# Patient Record
Sex: FEMALE | Race: WHITE | Employment: FULL TIME | URBAN - NONMETROPOLITAN AREA
[De-identification: names, ages, dates, MRNs, and addresses within clinical notes are randomized per-mention and may not be internally consistent; named-entity substitution may affect disease eponyms.]

---

## 2019-03-25 ENCOUNTER — HOSPITAL ENCOUNTER (EMERGENCY)
Age: 24
Discharge: HOME | End: 2019-03-25
Payer: COMMERCIAL

## 2019-03-25 VITALS
DIASTOLIC BLOOD PRESSURE: 70 MMHG | OXYGEN SATURATION: 100 % | BODY MASS INDEX: 24.59 KG/M2 | WEIGHT: 144 LBS | HEIGHT: 64 IN | HEART RATE: 80 BPM | SYSTOLIC BLOOD PRESSURE: 128 MMHG | RESPIRATION RATE: 16 BRPM | TEMPERATURE: 98.2 F

## 2019-03-25 DIAGNOSIS — L20.9 ATOPIC DERMATITIS, UNSPECIFIED TYPE: Primary | ICD-10-CM

## 2019-03-25 PROCEDURE — 99213 OFFICE O/P EST LOW 20 MIN: CPT | Performed by: NURSE PRACTITIONER

## 2019-03-25 PROCEDURE — 99213 OFFICE O/P EST LOW 20 MIN: CPT

## 2019-03-25 RX ORDER — M-VIT,TX,IRON,MINS/CALC/FOLIC 27MG-0.4MG
1 TABLET ORAL DAILY
COMMUNITY

## 2019-03-25 RX ORDER — MELATONIN 3 MG
TABLET ORAL
Status: ON HOLD | COMMUNITY
End: 2021-02-03

## 2019-03-25 RX ORDER — METHYLPREDNISOLONE 4 MG/1
TABLET ORAL
Qty: 1 KIT | Refills: 0 | Status: SHIPPED | OUTPATIENT
Start: 2019-03-25 | End: 2019-03-25 | Stop reason: SDUPTHER

## 2019-03-25 RX ORDER — AMPICILLIN TRIHYDRATE 250 MG
CAPSULE ORAL
Status: ON HOLD | COMMUNITY
End: 2021-02-03

## 2019-03-25 RX ORDER — METHYLPREDNISOLONE 4 MG/1
TABLET ORAL
Qty: 1 KIT | Refills: 0 | Status: SHIPPED | OUTPATIENT
Start: 2019-03-25 | End: 2019-03-31

## 2019-03-25 ASSESSMENT — ENCOUNTER SYMPTOMS
VOICE CHANGE: 0
NAUSEA: 0
COLOR CHANGE: 1
DIARRHEA: 0
WHEEZING: 0
ABDOMINAL DISTENTION: 0
CHEST TIGHTNESS: 0
ANAL BLEEDING: 0
SINUS PRESSURE: 0
SORE THROAT: 0
STRIDOR: 0
CHOKING: 0
SHORTNESS OF BREATH: 0
FACIAL SWELLING: 0
COUGH: 0
RHINORRHEA: 0
PERI-ORBITAL EDEMA: 0
CONSTIPATION: 0
APNEA: 0
RECTAL PAIN: 0
TROUBLE SWALLOWING: 0
THROAT SWELLING: 0
HOARSE VOICE: 0
SINUS PAIN: 0
BLOOD IN STOOL: 0
ABDOMINAL PAIN: 0
VOMITING: 0

## 2019-05-26 ENCOUNTER — HOSPITAL ENCOUNTER (EMERGENCY)
Age: 24
Discharge: HOME | End: 2019-05-26
Payer: COMMERCIAL

## 2019-05-26 VITALS
OXYGEN SATURATION: 100 % | DIASTOLIC BLOOD PRESSURE: 77 MMHG | SYSTOLIC BLOOD PRESSURE: 114 MMHG | HEART RATE: 52 BPM | WEIGHT: 138 LBS | TEMPERATURE: 98.2 F | HEIGHT: 64 IN | BODY MASS INDEX: 23.56 KG/M2 | RESPIRATION RATE: 16 BRPM

## 2019-05-26 DIAGNOSIS — L23.9 ALLERGIC CONTACT DERMATITIS, UNSPECIFIED TRIGGER: Primary | ICD-10-CM

## 2019-05-26 PROCEDURE — 99213 OFFICE O/P EST LOW 20 MIN: CPT | Performed by: NURSE PRACTITIONER

## 2019-05-26 PROCEDURE — 99212 OFFICE O/P EST SF 10 MIN: CPT

## 2019-05-26 RX ORDER — PREDNISONE 20 MG/1
20 TABLET ORAL 2 TIMES DAILY
Qty: 10 TABLET | Refills: 0 | Status: SHIPPED | OUTPATIENT
Start: 2019-05-26 | End: 2019-05-31

## 2019-05-26 ASSESSMENT — ENCOUNTER SYMPTOMS
DIARRHEA: 0
TROUBLE SWALLOWING: 0
VOMITING: 0
SHORTNESS OF BREATH: 0
SORE THROAT: 0
NAUSEA: 0

## 2019-05-26 NOTE — ED PROVIDER NOTES
[place]  Urgent Care Encounter       CHIEF COMPLAINT       Chief Complaint   Patient presents with    Facial Swelling       Nurses Notes reviewed and I agree except as noted in the HPI. HISTORY OF PRESENT ILLNESS   Aylin Buck is a 24 y.o. female who presents to the urgent care for complaints of facial swelling and rash. The patient states she woke with this rash this morning. She is been using Benadryl to control. She denies any new beauty products, lotions, soaps. She denies any known contact with any plants or irritants. She denies any shortness of breath or difficulty swallowing. She states the rash is present on her face. HPI    REVIEW OF SYSTEMS     Review of Systems   Constitutional: Negative for activity change, appetite change, chills and fever. HENT: Negative for sore throat and trouble swallowing. Respiratory: Negative for shortness of breath. Cardiovascular: Negative for chest pain. Gastrointestinal: Negative for diarrhea, nausea and vomiting. Skin: Positive for rash. Allergic/Immunologic: Negative for environmental allergies and food allergies. PAST MEDICAL HISTORY   History reviewed. No pertinent past medical history. SURGICALHISTORY     Patient  has no past surgical history on file. CURRENT MEDICATIONS       Previous Medications    CINNAMON 500 MG CAPS    Take by mouth    DHEA 10 MG TABS    Take by mouth    MULTIPLE VITAMINS-MINERALS (THERAPEUTIC MULTIVITAMIN-MINERALS) TABLET    Take 1 tablet by mouth daily    NONFORMULARY    Indications: Liptic acid        ALLERGIES     Patient is is allergic to percocet [oxycodone-acetaminophen] and vicodin [hydrocodone-acetaminophen]. Patients   There is no immunization history on file for this patient. FAMILY HISTORY     Patient's family history is not on file. SOCIAL HISTORY     Patient  reports that she has never smoked.  She has never used smokeless tobacco. She reports that she does not drink alcohol or use drugs. PHYSICAL EXAM     ED TRIAGE VITALS  BP: 114/77, Temp: 98.2 °F (36.8 °C), Pulse: 52, Resp: 16, SpO2: 100 %,Estimated body mass index is 23.69 kg/m² as calculated from the following:    Height as of this encounter: 5' 4\" (1.626 m). Weight as of this encounter: 138 lb (62.6 kg). ,Patient's last menstrual period was 05/06/2019. Physical Exam   Constitutional: She is oriented to person, place, and time. Vital signs are normal. She appears well-developed and well-nourished. She is active and cooperative. Non-toxic appearance. She does not have a sickly appearance. She does not appear ill. No distress. HENT:   Head: Normocephalic and atraumatic. Cardiovascular: Normal rate. Pulmonary/Chest: Effort normal.   Neurological: She is alert and oriented to person, place, and time. Skin: Skin is warm and dry. Rash noted. Rash is maculopapular (Macular papular rash noted on the patient's face. Consistent with contact dermatitis. ). Nursing note and vitals reviewed. DIAGNOSTIC RESULTS     Labs:No results found for this visit on 05/26/19. IMAGING:    No orders to display         EKG:      URGENT CARE COURSE:     Vitals:    05/26/19 1504   BP: 114/77   Pulse: 52   Resp: 16   Temp: 98.2 °F (36.8 °C)   TempSrc: Oral   SpO2: 100%   Weight: 138 lb (62.6 kg)   Height: 5' 4\" (1.626 m)       Medications - No data to display         PROCEDURES:  None    FINAL IMPRESSION      1. Allergic contact dermatitis, unspecified trigger          DISPOSITION/ PLAN     The patient's physical exam is consistent allergic contact dermatitis. She will be treated with prednisone. The patient is to take the medication as prescribed/directed. The patient is to follow-up with their PCP in 2-3 days. They are to go to the ER for any worsening symptoms. The patient/caregiver were agreeable to this plan of care and voiced no concerns at time of discharge.   The patient was ambulatory out of the

## 2019-05-26 NOTE — ED TRIAGE NOTES
Pt to [facility] ambulatory with facial swelling. This started around 0700 today. No new foods, medications, or body lotions.

## 2020-11-10 ENCOUNTER — HOSPITAL ENCOUNTER (EMERGENCY)
Age: 25
Discharge: HOME | End: 2020-11-10
Attending: EMERGENCY MEDICINE
Payer: COMMERCIAL

## 2020-11-10 ENCOUNTER — APPOINTMENT (OUTPATIENT)
Dept: INTERVENTIONAL RADIOLOGY/VASCULAR | Age: 25
End: 2020-11-10
Payer: COMMERCIAL

## 2020-11-10 VITALS
TEMPERATURE: 98.9 F | SYSTOLIC BLOOD PRESSURE: 110 MMHG | HEART RATE: 72 BPM | OXYGEN SATURATION: 99 % | RESPIRATION RATE: 18 BRPM | DIASTOLIC BLOOD PRESSURE: 68 MMHG

## 2020-11-10 PROCEDURE — 93971 EXTREMITY STUDY: CPT

## 2020-11-10 PROCEDURE — 99283 EMERGENCY DEPT VISIT LOW MDM: CPT

## 2020-11-10 ASSESSMENT — PAIN SCALES - GENERAL: PAINLEVEL_OUTOF10: 6

## 2020-11-10 ASSESSMENT — PAIN DESCRIPTION - PAIN TYPE: TYPE: ACUTE PAIN

## 2020-11-10 ASSESSMENT — PAIN DESCRIPTION - ORIENTATION: ORIENTATION: RIGHT

## 2020-11-10 ASSESSMENT — PAIN DESCRIPTION - LOCATION: LOCATION: LEG

## 2020-11-10 ASSESSMENT — PAIN DESCRIPTION - DESCRIPTORS: DESCRIPTORS: ACHING;BURNING

## 2020-11-10 NOTE — ED PROVIDER NOTES
[address] ENCOUNTER      PATIENT NAME: Tiffanie Ramey  MRN: [id number]  : 1995  RITCHIE: 11/10/2020  PROVIDER: Axel Ramos MD      CHIEF COMPLAINT       Chief Complaint   Patient presents with    Leg Swelling       Nurses Notes reviewed and I agreeexcept as noted in the HPI. HISTORY OF PRESENT ILLNESS    Tiffanie Ramey is a 31 y.o. female who presents to Emergency Department with right leg pain in the last 10 days. Onset: Gradual  Location: At home  Severity: Mild to moderate  Timing: In the last 1.5 weeks  Modifying factors: Worse on standing for long time  Quality: Pain and swelling sensation to right lower leg. Radiation: None  Duration: Intermittent  Context: Right leg pain and worsening superficial varicosity vein in the last 1.5 weeks. She is pregnant at 27 weeks. Prior episodes: Occasional  Therapy today: None  Associated Symptoms: No shortness of breath  Additional history: OB is Soha Sinha. This HPI was provided by the patient. REVIEW OF SYSTEMS     Review of Systems   Constitutional: Negative for activity change, appetite change, chills, fatigue, fever and unexpected weight change. HENT: Negative for congestion, ear discharge, ear pain, hearing loss, nosebleeds, rhinorrhea and sore throat. Eyes: Negative for photophobia, pain, discharge, redness and itching. Respiratory: Negative for cough, chest tightness, shortness of breath, wheezing and stridor. Cardiovascular: Negative for chest pain, palpitations and leg swelling. Gastrointestinal: Negative for abdominal distention, abdominal pain, constipation, diarrhea, nausea and vomiting. Endocrine: Negative for cold intolerance, heat intolerance, polydipsia and polyphagia. Genitourinary: Negative for dysuria, flank pain, frequency and hematuria. Musculoskeletal: Positive for myalgias. Negative for arthralgias, back pain, gait problem, neck pain and neck stiffness. Skin: Negative for pallor, rash and wound. Allergic/Immunologic: Negative for environmental allergies and food allergies. Neurological: Negative for dizziness, tremors, syncope, weakness and headaches. Psychiatric/Behavioral: Negative for agitation, behavioral problems, confusion, self-injury, sleep disturbance and suicidal ideas. PAST MEDICAL HISTORY   History reviewed. No pertinent past medical history. SURGICAL HISTORY     History reviewed. No pertinent surgical history. CURRENT MEDICATIONS       Discharge Medication List as of 11/10/2020  9:00 PM      CONTINUE these medications which have NOT CHANGED    Details   Multiple Vitamins-Minerals (THERAPEUTIC MULTIVITAMIN-MINERALS) tablet Take 1 tablet by mouth dailyHistorical Med      Cinnamon 500 MG CAPS Take by mouthHistorical Med      DHEA 10 MG TABS Take by mouthHistorical Med      NONFORMULARY Indications: Liptic acid Historical Med             ALLERGIES     Percocet [oxycodone-acetaminophen] and Vicodin [hydrocodone-acetaminophen]    FAMILY HISTORY     has no family status information on file. family history is not on file. SOCIAL HISTORY      reports that she has never smoked. She has never used smokeless tobacco. She reports that she does not drink alcohol or use drugs. PHYSICAL EXAM     INITIAL VITALS:  oral temperature is 98.9 °F (37.2 °C). Her blood pressure is 110/68 and her pulse is 72. Her respiration is 18 and oxygen saturation is 99%. Physical Exam  Vitals signs and nursing note reviewed. Constitutional:       Appearance: She is well-developed. She is not diaphoretic. HENT:      Head: Normocephalic and atraumatic. Nose: Nose normal.   Eyes:      General: No scleral icterus. Right eye: No discharge. Left eye: No discharge. Conjunctiva/sclera: Conjunctivae normal.      Pupils: Pupils are equal, round, and reactive to light. Neck:      Musculoskeletal: Normal range of motion and neck supple. Vascular: No JVD. Trachea: No tracheal deviation. Cardiovascular:      Rate and Rhythm: Normal rate and regular rhythm. Heart sounds: Normal heart sounds. No murmur. No friction rub. No gallop. Pulmonary:      Effort: Pulmonary effort is normal. No respiratory distress. Breath sounds: Normal breath sounds. No stridor. No wheezing or rales. Chest:      Chest wall: No tenderness. Abdominal:      General: Bowel sounds are normal. There is no distension. Palpations: Abdomen is soft. There is no mass. Tenderness: There is no abdominal tenderness. There is no guarding or rebound. Hernia: No hernia is present. Musculoskeletal:         General: No tenderness or deformity. Comments: Mild vein varicosity to right lateral and medial proximal tibia area. No calf swelling. No calf squeezing pain. Lymphadenopathy:      Cervical: No cervical adenopathy. Skin:     General: Skin is warm and dry. Capillary Refill: Capillary refill takes less than 2 seconds. Coloration: Skin is not pale. Findings: No erythema or rash. Neurological:      Mental Status: She is alert and oriented to person, place, and time. Cranial Nerves: No cranial nerve deficit. Sensory: No sensory deficit. Motor: No abnormal muscle tone. Coordination: Coordination normal.      Deep Tendon Reflexes: Reflexes normal.   Psychiatric:         Behavior: Behavior normal.         Thought Content:  Thought content normal.         Judgment: Judgment normal.         DIFFERENTIAL DIAGNOSIS:   DVT, superficial varicose vein, idiopathic edema, muscle strain    DIAGNOSTIC RESULTS   EKG: All EKG's are interpreted by the Emergency Department Physician who either signs or Co-signsthis chart in the absence of a cardiologist.  Interpreted by me  Not indicated    RADIOLOGY: non-plain film images(s) such as CT, Ultrasound and MRI are read by the radiologist.  VL DUP LOWER EXTREMITY VENOUS RIGHT Final Result   No evidence of deep vein thrombosis. **This report has been created using voice recognition software. It may contain minor errors which are inherent in voice recognition technology. **      Final report electronically signed by Dr. Sonia Nazario on 11/10/2020 8:31 PM          LABS:   No results found for this visit on 11/10/20. EMERGENCY DEPARTMENT COURSE:   Vitals:    Vitals:    11/10/20 1835 11/10/20 1942 11/10/20 2046   BP:  108/63 110/68   Pulse: 84 70 72   Resp: 19 18 18   Temp: 98.9 °F (37.2 °C)     TempSrc: Oral     SpO2: 98% 98% 99%     6:54 PM: Patient is seen and evaluated in a timely fashion. ACTIONS:    VL DUP LOWER EXTREMITY VENOUS RIGHT  Labs Reviewed - No data to display  Medications - No data to display    MEDICAL DECISION MAKINGS:    Right lower extremity venous duplex negative for DVT. Fetal heart tone 131/min, positive fetal movement, patient was reassured and discharged with OB follow-up in 1 week as scheduled. CRITICAL CARE:   None    CONSULTS:  None    PROCEDURES:  None    FINAL IMPRESSION      1. Normal pregnancy, second trimester    2. Right leg pain    3.  Symptomatic varicose veins of right lower extremity          DISPOSITION/PLAN   Discharge home    PATIENT REFERRED TO:  Corry Child MD  [address] [place] [address]  [phone]    In 1 week  As scheduled      DISCHARGE MEDICATIONS:  Discharge Medication List as of 11/10/2020  9:00 PM          (Please note that portions of this note were completed with a voice recognition program.  Efforts were made to edit the dictations but occasionally words aremis-transcribed.)    MD Caitlin Suazo MD  11/11/20 9811

## 2020-11-11 ASSESSMENT — ENCOUNTER SYMPTOMS
CONSTIPATION: 0
ABDOMINAL PAIN: 0
ABDOMINAL DISTENTION: 0
EYE DISCHARGE: 0
EYE PAIN: 0
EYE ITCHING: 0
STRIDOR: 0
BACK PAIN: 0
NAUSEA: 0
COUGH: 0
VOMITING: 0
RHINORRHEA: 0
EYE REDNESS: 0
WHEEZING: 0
SHORTNESS OF BREATH: 0
DIARRHEA: 0
PHOTOPHOBIA: 0
CHEST TIGHTNESS: 0
SORE THROAT: 0

## 2020-11-11 NOTE — ED NOTES
Patient resting in bed. Respirations easy and unlabored. No distress noted. Call light within reach.        Carissa Burgos RN  11/10/20 2040

## 2020-11-11 NOTE — ED NOTES
Patient resting in bed. Respirations easy and unlabored. No distress noted. Call light within reach.        Carissa Burgos RN  11/10/20 6806

## 2021-02-03 ENCOUNTER — APPOINTMENT (OUTPATIENT)
Dept: LABOR AND DELIVERY | Age: 26
End: 2021-02-03
Payer: COMMERCIAL

## 2021-02-03 ENCOUNTER — HOSPITAL ENCOUNTER (INPATIENT)
Age: 26
LOS: 2 days | Discharge: HOME | End: 2021-02-05
Attending: OBSTETRICS & GYNECOLOGY | Admitting: OBSTETRICS & GYNECOLOGY
Payer: COMMERCIAL

## 2021-02-03 ENCOUNTER — ANESTHESIA (OUTPATIENT)
Dept: LABOR AND DELIVERY | Age: 26
End: 2021-02-03
Payer: COMMERCIAL

## 2021-02-03 ENCOUNTER — ANESTHESIA EVENT (OUTPATIENT)
Dept: LABOR AND DELIVERY | Age: 26
End: 2021-02-03
Payer: COMMERCIAL

## 2021-02-03 LAB
ABO: NORMAL
AMPHETAMINE+METHAMPHETAMINE URINE SCREEN: NEGATIVE
ANTIBODY SCREEN: NORMAL
BARBITURATE QUANTITATIVE URINE: NEGATIVE
BENZODIAZEPINE QUANTITATIVE URINE: NEGATIVE
CANNABINOID QUANTITATIVE URINE: NEGATIVE
COCAINE METABOLITE QUANTITATIVE URINE: NEGATIVE
ERYTHROCYTE [DISTWIDTH] IN BLOOD BY AUTOMATED COUNT: 13.4 % (ref 11.5–14.5)
ERYTHROCYTE [DISTWIDTH] IN BLOOD BY AUTOMATED COUNT: 45.7 FL (ref 35–45)
HCT VFR BLD CALC: 37 % (ref 37–47)
HEMOGLOBIN: 12.6 GM/DL (ref 12–16)
MCH RBC QN AUTO: 32.1 PG (ref 26–33)
MCHC RBC AUTO-ENTMCNC: 34.1 GM/DL (ref 32.2–35.5)
MCV RBC AUTO: 94.4 FL (ref 81–99)
OPIATES, URINE: NEGATIVE
OXYCODONE: NEGATIVE
PHENCYCLIDINE QUANTITATIVE URINE: NEGATIVE
PLATELET # BLD: 164 THOU/MM3 (ref 130–400)
PMV BLD AUTO: 9.5 FL (ref 9.4–12.4)
RBC # BLD: 3.92 MILL/MM3 (ref 4.2–5.4)
RH FACTOR: NORMAL
RPR: NONREACTIVE
WBC # BLD: 11.9 THOU/MM3 (ref 4.8–10.8)

## 2021-02-03 PROCEDURE — 80307 DRUG TEST PRSMV CHEM ANLYZR: CPT

## 2021-02-03 PROCEDURE — 6360000002 HC RX W HCPCS

## 2021-02-03 PROCEDURE — 36415 COLL VENOUS BLD VENIPUNCTURE: CPT

## 2021-02-03 PROCEDURE — 3700000025 EPIDURAL BLOCK: Performed by: ANESTHESIOLOGY

## 2021-02-03 PROCEDURE — 6370000000 HC RX 637 (ALT 250 FOR IP): Performed by: OBSTETRICS & GYNECOLOGY

## 2021-02-03 PROCEDURE — 86850 RBC ANTIBODY SCREEN: CPT

## 2021-02-03 PROCEDURE — 10907ZC DRAINAGE OF AMNIOTIC FLUID, THERAPEUTIC FROM PRODUCTS OF CONCEPTION, VIA NATURAL OR ARTIFICIAL OPENING: ICD-10-PCS | Performed by: OBSTETRICS & GYNECOLOGY

## 2021-02-03 PROCEDURE — 3E033VJ INTRODUCTION OF OTHER HORMONE INTO PERIPHERAL VEIN, PERCUTANEOUS APPROACH: ICD-10-PCS | Performed by: OBSTETRICS & GYNECOLOGY

## 2021-02-03 PROCEDURE — 86592 SYPHILIS TEST NON-TREP QUAL: CPT

## 2021-02-03 PROCEDURE — 0KQM0ZZ REPAIR PERINEUM MUSCLE, OPEN APPROACH: ICD-10-PCS | Performed by: OBSTETRICS & GYNECOLOGY

## 2021-02-03 PROCEDURE — 2500000003 HC RX 250 WO HCPCS: Performed by: ANESTHESIOLOGY

## 2021-02-03 PROCEDURE — 6360000002 HC RX W HCPCS: Performed by: OBSTETRICS & GYNECOLOGY

## 2021-02-03 PROCEDURE — 2580000003 HC RX 258: Performed by: OBSTETRICS & GYNECOLOGY

## 2021-02-03 PROCEDURE — 86900 BLOOD TYPING SEROLOGIC ABO: CPT

## 2021-02-03 PROCEDURE — 1200000000 HC SEMI PRIVATE

## 2021-02-03 PROCEDURE — 85027 COMPLETE CBC AUTOMATED: CPT

## 2021-02-03 PROCEDURE — 6360000002 HC RX W HCPCS: Performed by: ANESTHESIOLOGY

## 2021-02-03 PROCEDURE — 7200000001 HC VAGINAL DELIVERY

## 2021-02-03 PROCEDURE — 86901 BLOOD TYPING SEROLOGIC RH(D): CPT

## 2021-02-03 RX ORDER — IBUPROFEN 800 MG/1
800 TABLET ORAL EVERY 8 HOURS PRN
Status: DISCONTINUED | OUTPATIENT
Start: 2021-02-03 | End: 2021-02-03 | Stop reason: HOSPADM

## 2021-02-03 RX ORDER — METHYLERGONOVINE MALEATE 0.2 MG/ML
200 INJECTION INTRAVENOUS PRN
Status: DISCONTINUED | OUTPATIENT
Start: 2021-02-03 | End: 2021-02-03 | Stop reason: HOSPADM

## 2021-02-03 RX ORDER — MODIFIED LANOLIN
OINTMENT (GRAM) TOPICAL PRN
Status: DISCONTINUED | OUTPATIENT
Start: 2021-02-03 | End: 2021-02-05 | Stop reason: HOSPADM

## 2021-02-03 RX ORDER — MEPERIDINE HYDROCHLORIDE 25 MG/ML
INJECTION INTRAMUSCULAR; INTRAVENOUS; SUBCUTANEOUS
Status: DISCONTINUED
Start: 2021-02-03 | End: 2021-02-03

## 2021-02-03 RX ORDER — DIPHENHYDRAMINE HYDROCHLORIDE 50 MG/ML
25 INJECTION INTRAMUSCULAR; INTRAVENOUS EVERY 4 HOURS PRN
Status: DISCONTINUED | OUTPATIENT
Start: 2021-02-03 | End: 2021-02-03 | Stop reason: HOSPADM

## 2021-02-03 RX ORDER — ONDANSETRON 4 MG/1
8 TABLET, ORALLY DISINTEGRATING ORAL EVERY 8 HOURS PRN
Status: DISCONTINUED | OUTPATIENT
Start: 2021-02-03 | End: 2021-02-05 | Stop reason: HOSPADM

## 2021-02-03 RX ORDER — SODIUM CHLORIDE 0.9 % (FLUSH) 0.9 %
10 SYRINGE (ML) INJECTION PRN
Status: DISCONTINUED | OUTPATIENT
Start: 2021-02-03 | End: 2021-02-05 | Stop reason: HOSPADM

## 2021-02-03 RX ORDER — CARBOPROST TROMETHAMINE 250 UG/ML
250 INJECTION, SOLUTION INTRAMUSCULAR PRN
Status: DISCONTINUED | OUTPATIENT
Start: 2021-02-03 | End: 2021-02-03 | Stop reason: HOSPADM

## 2021-02-03 RX ORDER — FENTANYL CITRATE 50 UG/ML
INJECTION, SOLUTION INTRAMUSCULAR; INTRAVENOUS PRN
Status: DISCONTINUED | OUTPATIENT
Start: 2021-02-03 | End: 2021-02-03 | Stop reason: SDUPTHER

## 2021-02-03 RX ORDER — MISOPROSTOL 200 UG/1
1000 TABLET ORAL PRN
Status: DISCONTINUED | OUTPATIENT
Start: 2021-02-03 | End: 2021-02-05 | Stop reason: HOSPADM

## 2021-02-03 RX ORDER — ONDANSETRON 2 MG/ML
8 INJECTION INTRAMUSCULAR; INTRAVENOUS EVERY 6 HOURS PRN
Status: DISCONTINUED | OUTPATIENT
Start: 2021-02-03 | End: 2021-02-03 | Stop reason: HOSPADM

## 2021-02-03 RX ORDER — MISOPROSTOL 200 UG/1
1000 TABLET ORAL PRN
Status: DISCONTINUED | OUTPATIENT
Start: 2021-02-03 | End: 2021-02-03 | Stop reason: HOSPADM

## 2021-02-03 RX ORDER — ONDANSETRON 2 MG/ML
4 INJECTION INTRAMUSCULAR; INTRAVENOUS EVERY 6 HOURS PRN
Status: DISCONTINUED | OUTPATIENT
Start: 2021-02-03 | End: 2021-02-03 | Stop reason: HOSPADM

## 2021-02-03 RX ORDER — SODIUM CHLORIDE, SODIUM LACTATE, POTASSIUM CHLORIDE, CALCIUM CHLORIDE 600; 310; 30; 20 MG/100ML; MG/100ML; MG/100ML; MG/100ML
INJECTION, SOLUTION INTRAVENOUS CONTINUOUS
Status: DISCONTINUED | OUTPATIENT
Start: 2021-02-03 | End: 2021-02-05 | Stop reason: HOSPADM

## 2021-02-03 RX ORDER — ACETAMINOPHEN 325 MG/1
650 TABLET ORAL EVERY 4 HOURS PRN
Status: DISCONTINUED | OUTPATIENT
Start: 2021-02-03 | End: 2021-02-05 | Stop reason: HOSPADM

## 2021-02-03 RX ORDER — FENTANYL CITRATE 50 UG/ML
INJECTION, SOLUTION INTRAMUSCULAR; INTRAVENOUS
Status: COMPLETED
Start: 2021-02-03 | End: 2021-02-03

## 2021-02-03 RX ORDER — MEPERIDINE HYDROCHLORIDE 25 MG/ML
50 INJECTION INTRAMUSCULAR; INTRAVENOUS; SUBCUTANEOUS ONCE
Status: COMPLETED | OUTPATIENT
Start: 2021-02-03 | End: 2021-02-03

## 2021-02-03 RX ORDER — PROMETHAZINE HYDROCHLORIDE 25 MG/ML
50 INJECTION, SOLUTION INTRAMUSCULAR; INTRAVENOUS ONCE
Status: COMPLETED | OUTPATIENT
Start: 2021-02-03 | End: 2021-02-03

## 2021-02-03 RX ORDER — SODIUM CHLORIDE 0.9 % (FLUSH) 0.9 %
10 SYRINGE (ML) INJECTION EVERY 12 HOURS SCHEDULED
Status: DISCONTINUED | OUTPATIENT
Start: 2021-02-03 | End: 2021-02-05

## 2021-02-03 RX ORDER — IBUPROFEN 800 MG/1
800 TABLET ORAL EVERY 8 HOURS SCHEDULED
Status: DISCONTINUED | OUTPATIENT
Start: 2021-02-04 | End: 2021-02-05 | Stop reason: HOSPADM

## 2021-02-03 RX ORDER — SODIUM CHLORIDE, SODIUM LACTATE, POTASSIUM CHLORIDE, CALCIUM CHLORIDE 600; 310; 30; 20 MG/100ML; MG/100ML; MG/100ML; MG/100ML
INJECTION, SOLUTION INTRAVENOUS CONTINUOUS
Status: DISCONTINUED | OUTPATIENT
Start: 2021-02-03 | End: 2021-02-03

## 2021-02-03 RX ORDER — FERROUS SULFATE 325(65) MG
325 TABLET ORAL
Status: DISCONTINUED | OUTPATIENT
Start: 2021-02-04 | End: 2021-02-05 | Stop reason: HOSPADM

## 2021-02-03 RX ORDER — METHYLERGONOVINE MALEATE 0.2 MG/ML
200 INJECTION INTRAVENOUS SEE ADMIN INSTRUCTIONS
Status: DISCONTINUED | OUTPATIENT
Start: 2021-02-03 | End: 2021-02-05 | Stop reason: HOSPADM

## 2021-02-03 RX ORDER — SEVOFLURANE 250 ML/250ML
1 LIQUID RESPIRATORY (INHALATION) CONTINUOUS PRN
Status: DISCONTINUED | OUTPATIENT
Start: 2021-02-03 | End: 2021-02-03 | Stop reason: HOSPADM

## 2021-02-03 RX ORDER — PROMETHAZINE HYDROCHLORIDE 25 MG/ML
INJECTION, SOLUTION INTRAMUSCULAR; INTRAVENOUS
Status: DISCONTINUED
Start: 2021-02-03 | End: 2021-02-03

## 2021-02-03 RX ORDER — TERBUTALINE SULFATE 1 MG/ML
0.25 INJECTION, SOLUTION SUBCUTANEOUS ONCE
Status: COMPLETED | OUTPATIENT
Start: 2021-02-03 | End: 2021-02-03

## 2021-02-03 RX ORDER — LIDOCAINE HYDROCHLORIDE 10 MG/ML
30 INJECTION, SOLUTION EPIDURAL; INFILTRATION; INTRACAUDAL; PERINEURAL PRN
Status: DISCONTINUED | OUTPATIENT
Start: 2021-02-03 | End: 2021-02-03 | Stop reason: HOSPADM

## 2021-02-03 RX ORDER — ASPIRIN 81 MG/1
81 TABLET, CHEWABLE ORAL DAILY
COMMUNITY

## 2021-02-03 RX ORDER — DOCUSATE SODIUM 100 MG/1
100 CAPSULE, LIQUID FILLED ORAL 2 TIMES DAILY PRN
Status: DISCONTINUED | OUTPATIENT
Start: 2021-02-03 | End: 2021-02-03 | Stop reason: HOSPADM

## 2021-02-03 RX ORDER — DOCUSATE SODIUM 100 MG/1
100 CAPSULE, LIQUID FILLED ORAL 2 TIMES DAILY PRN
Status: DISCONTINUED | OUTPATIENT
Start: 2021-02-03 | End: 2021-02-05 | Stop reason: HOSPADM

## 2021-02-03 RX ORDER — BISACODYL 10 MG
10 SUPPOSITORY, RECTAL RECTAL DAILY PRN
Status: DISCONTINUED | OUTPATIENT
Start: 2021-02-03 | End: 2021-02-05 | Stop reason: HOSPADM

## 2021-02-03 RX ORDER — CARBOPROST TROMETHAMINE 250 UG/ML
250 INJECTION, SOLUTION INTRAMUSCULAR
Status: ACTIVE | OUTPATIENT
Start: 2021-02-03 | End: 2021-02-03

## 2021-02-03 RX ORDER — NALOXONE HYDROCHLORIDE 0.4 MG/ML
0.4 INJECTION, SOLUTION INTRAMUSCULAR; INTRAVENOUS; SUBCUTANEOUS PRN
Status: DISCONTINUED | OUTPATIENT
Start: 2021-02-03 | End: 2021-02-03 | Stop reason: HOSPADM

## 2021-02-03 RX ADMIN — MEPERIDINE HYDROCHLORIDE 50 MG: 25 INJECTION INTRAMUSCULAR; INTRAVENOUS; SUBCUTANEOUS at 08:37

## 2021-02-03 RX ADMIN — PROMETHAZINE HYDROCHLORIDE 50 MG: 25 INJECTION INTRAMUSCULAR; INTRAVENOUS at 08:37

## 2021-02-03 RX ADMIN — ONDANSETRON 8 MG: 2 INJECTION INTRAMUSCULAR; INTRAVENOUS at 20:07

## 2021-02-03 RX ADMIN — SODIUM CHLORIDE, POTASSIUM CHLORIDE, SODIUM LACTATE AND CALCIUM CHLORIDE: 600; 310; 30; 20 INJECTION, SOLUTION INTRAVENOUS at 10:40

## 2021-02-03 RX ADMIN — TERBUTALINE SULFATE 0.25 MG: 1 INJECTION SUBCUTANEOUS at 15:55

## 2021-02-03 RX ADMIN — SODIUM CHLORIDE, POTASSIUM CHLORIDE, SODIUM LACTATE AND CALCIUM CHLORIDE: 600; 310; 30; 20 INJECTION, SOLUTION INTRAVENOUS at 15:26

## 2021-02-03 RX ADMIN — Medication 1 MILLI-UNITS/MIN: at 07:20

## 2021-02-03 RX ADMIN — FENTANYL CITRATE 100 MCG: 50 INJECTION, SOLUTION INTRAMUSCULAR; INTRAVENOUS at 12:50

## 2021-02-03 RX ADMIN — SODIUM CHLORIDE, POTASSIUM CHLORIDE, SODIUM LACTATE AND CALCIUM CHLORIDE: 600; 310; 30; 20 INJECTION, SOLUTION INTRAVENOUS at 06:10

## 2021-02-03 RX ADMIN — IBUPROFEN 800 MG: 800 TABLET, FILM COATED ORAL at 19:09

## 2021-02-03 RX ADMIN — Medication 166.7 ML: at 17:40

## 2021-02-03 RX ADMIN — BENZOCAINE AND LEVOMENTHOL: 200; 5 SPRAY TOPICAL at 17:45

## 2021-02-03 RX ADMIN — Medication 18 ML/HR: at 12:50

## 2021-02-03 RX ADMIN — SODIUM CHLORIDE, POTASSIUM CHLORIDE, SODIUM LACTATE AND CALCIUM CHLORIDE: 600; 310; 30; 20 INJECTION, SOLUTION INTRAVENOUS at 13:08

## 2021-02-03 RX ADMIN — SODIUM CHLORIDE, POTASSIUM CHLORIDE, SODIUM LACTATE AND CALCIUM CHLORIDE: 600; 310; 30; 20 INJECTION, SOLUTION INTRAVENOUS at 17:45

## 2021-02-03 ASSESSMENT — PAIN SCALES - GENERAL: PAINLEVEL_OUTOF10: 0

## 2021-02-03 ASSESSMENT — PAIN DESCRIPTION - DESCRIPTORS: DESCRIPTORS: ACHING;CRAMPING

## 2021-02-03 NOTE — PROGRESS NOTES
Pt hurting with ctx. CE: 4/70/-2. AROM with clear fluid. Cat 1 tracing. Pt requesting epidural at this time.

## 2021-02-03 NOTE — H&P
[address] [address]  History and Physical Update    Pt Name: Cheryl Cook  MRN: [id number]  YOB: 1995  Date of evaluation: 2/3/2021    [x] I have examined the patient and reviewed the H&P/Consult and there are no changes to the patient or plans. 30yo  at 39/4 for induction of labor due to term gestation. CE:/-2 per Dr. Lexx Jay. Love placed earlier by Dr. Michael Peacock. Cat 1 tracing. GBS neg. Induction via love, pitocin to 10mu and increase per protocol once love is out. Will AROM when able. [] I have examined the patient and reviewed the H&P/Consult and have noted the following changes:       Discussion with the patient and/ or family for proposed care, treatment, services; benefits, risks, side effects; likelihood of achieving goals and potential problems that may occur during recuperation was had and all questions were answered. Discussion with the patient and/ or family of reasonable alternatives to the proposed care, treatment, services and the discussion of the risks, benefits, side effects related to the alternatives and the risk related to not receiving the proposed care treatment services was also had and all questions were answered. If this is for an elective surgical procedure then The patient was counseled at length about the risks of sivakumar Covid-19 during their perioperative period and any recovery window from their procedure. The patient was made aware that sivakumar Covid-19  may worsen their prognosis for recovering from their procedure  and lend to a higher morbidity and/or mortality risk. All material risks, benefits, and reasonable alternatives including postponing the procedure were discussed. The patient  does wish to proceed with the procedure at this time.              Rosie Holding  Electronically signed 2/3/2021 at 7:44 AM

## 2021-02-03 NOTE — FLOWSHEET NOTE
Dr farhad Guerra in to room, inserted #18 Danish love catheter into cervix for love ripening, inflated with 60 ml saline, then 0.9 ns infusing at 60 ml per hour

## 2021-02-03 NOTE — ANESTHESIA PROCEDURE NOTES
Epidural Block    Patient location during procedure: OB  Reason for block: labor epidural  Staffing  Performed: anesthesiologist   Anesthesiologist: Bryan Ibarra, DO  Preanesthetic Checklist  Completed: patient identified, IV checked, site marked, risks and benefits discussed, surgical consent, monitors and equipment checked, pre-op evaluation, timeout performed, anesthesia consent given, oxygen available and patient being monitored  Epidural  Patient position: sitting  Prep: ChloraPrep  Patient monitoring: frequent blood pressure checks and continuous pulse ox  Approach: midline  Location: lumbar (1-5)  Injection technique: MARIS saline  Provider prep: sterile gloves and mask  Needle  Needle type: Tuohy   Needle gauge: 18 G  Needle length: 3.5 in  Needle insertion depth: 7 cm  Catheter type: side hole  Catheter size: 19 G  Catheter at skin depth: 11 cm  Test dose: negative  Assessment  Hemodynamics: stable  Attempts: 1

## 2021-02-03 NOTE — L&D DELIVERY NOTE
Department of Obstetrics and Gynecology  Spontaneous Vaginal Delivery Note      Pt Name: Raymond Gitelman  MRN: [id number] [place] #: [de-identified]  YOB: 1995  Procedure Performed By: Armando Garcia MD        Pre-operative Diagnosis:  Term pregnancy, Induced labor, Single fetus and Uncomplicated pregnancy    Post-operative Diagnosis: Same, delivered. Procedure: spontaneous vaginal delivery    Surgeon:  Mason Harrington    Information for the patient's :  Jennifer Case [[id number]]          Anesthesia:  epidural anesthesia    Estimated blood loss:  710 ml    Complications:  none    Condition:  infant stable to general nursery and mother stable    Delivery Summary:  The patient is a 31 y.o. Kimmie Lyn at 39w4d who was admitted for induction. She received the following interventions: ARBOW, IV Pitocin induction and cervical love bulb ripening. The patient progressed well,did receive an epidural, became complete and started to push. She was placed in the dorsal lithotomy position and prepped. She delivered the baby which was then placed on the mother's abdomen. CAN was noted x1. Cord was clamped and cut and infant handed off to the waiting nurse for evaluation. The placenta delivered intact, whole and that the umbilical cord had three vessels noted. The perineum and vagina were explored and a 2nd degree laceration was repaired with Vicryl 3.0. There was a periurethral which ran from the inferior aspect of the clitorus to the left side of the urethra and this was repaired using Vicryl 4.0. A red rubber was placed to ensure the urethra was open. Vaginal sweep was performed at the conclusion of delivery and all needles were taken off the field. Sponge counts correct      PMH:  History reviewed. No pertinent past medical history.       Ford Passer

## 2021-02-03 NOTE — ANESTHESIA PRE PROCEDURE
Department of Anesthesiology  Preprocedure Note       Name:  Tiesha Diaz   Age:  31 y.o.  :  1995                                          MRN:  [id number]         Date:  2/3/2021      Surgeon: * No surgeons listed *    Procedure: * No procedures listed *    Medications prior to admission:   Prior to Admission medications    Medication Sig Start Date End Date Taking?  Authorizing Provider   aspirin 81 MG chewable tablet Take 81 mg by mouth daily Due to pain and swelling in right leg   Yes Historical Provider, MD   Multiple Vitamins-Minerals (THERAPEUTIC MULTIVITAMIN-MINERALS) tablet Take 1 tablet by mouth daily   Yes Historical Provider, MD       Current medications:    Current Facility-Administered Medications   Medication Dose Route Frequency Provider Last Rate Last Admin    oxytocin (PITOCIN) 30 units in 500 mL infusion  1 david-units/min Intravenous Continuous Aidee Black MD 3 mL/hr at 21 1209 3 david-units/min at 21 1209    terbutaline (BRETHINE) injection 0.25 mg  0.25 mg Subcutaneous Once Aidee Black MD        lactated ringers infusion   Intravenous Continuous Aidee Black  mL/hr at 21 1220 Rate Change at 21 1220    lidocaine PF 1 % injection 30 mL  30 mL Other PRN Aidee Black MD        nitrous oxide 50% inhalation 1 each  1 each Inhalation Continuous PRN Aidee Black MD        ondansetron [facility]) injection 8 mg  8 mg Intravenous Q6H PRN Aidee Black MD        diphenhydrAMINE (BENADRYL) injection 25 mg  25 mg Intravenous Q4H PRN Aidee Black MD        oxytocin (PITOCIN) 10 unit bolus from the bag  10 Units Intravenous PRN Aidee Black MD        And    oxytocin (PITOCIN) 30 units in 500 mL infusion  87.3 david-units/min Intravenous PRN Aidee Black MD        methylergonovine (METHERGINE) injection 200 mcg  200 mcg Intramuscular PRN Aidee Black MD  carboprost (HEMABATE) injection 250 mcg  250 mcg Intramuscular PRN Tamie Johnson MD        miSOPROStol (CYTOTEC) tablet 1,000 mcg  1,000 mcg Rectal PRN Tamie Johnson MD        ibuprofen (ADVIL;MOTRIN) tablet 800 mg  800 mg Oral Q8H PRN Tamie Bud, MD        witch hazel-glycerin (TUCKS) pad   Topical PRN Tamie Johnson MD        benzocaine-menthol (DERMOPLAST) 20-0.5 % spray   Topical PRN Tamie Johnson MD        docusate sodium (COLACE) capsule 100 mg  100 mg Oral BID PRN Tamie Johnson MD        meperidine (DEMEROL) 25 MG/ML injection             promethazine (PHENERGAN) 25 MG/ML injection             fentaNYL (SUBLIMAZE) 100 MCG/2ML injection             naloxone (NARCAN) injection 0.4 mg  0.4 mg Intravenous PRN Juan Anguiano DO        ondansetron (ZOFRAN) injection 4 mg  4 mg Intravenous Q6H PRN Juan Anguiano DO        fentaNYL 750 mcg, ropivacaine 0.1% in sodium chloride 0.9% 265mL (OB) epidural  18 mL/hr Epidural Continuous Juan Anguiano DO           Allergies: Allergies   Allergen Reactions    Percocet [Oxycodone-Acetaminophen] Swelling    Vicodin [Hydrocodone-Acetaminophen] Nausea And Vomiting       Problem List:  There is no problem list on file for this patient. Past Medical History:  History reviewed. No pertinent past medical history. Past Surgical History:        Procedure Laterality Date    WISDOM TOOTH EXTRACTION  2012       Social History:    Social History     Tobacco Use    Smoking status: Never Smoker    Smokeless tobacco: Never Used   Substance Use Topics    Alcohol use:  No                                Counseling given: Not Answered      Vital Signs (Current):   Vitals:    02/03/21 1251 02/03/21 1256 02/03/21 1301 02/03/21 1302   BP: 99/60 (!) 102/56 (!) 95/51    Pulse: 93 86 74    Resp: 18 18 18    Temp:    97.2 °F (36.2 °C)   TempSrc:       SpO2: 100% 100% 100%    Weight:       Height: BP Readings from Last 3 Encounters:   02/03/21 (!) 95/51   11/10/20 110/68   05/26/19 114/77       NPO Status: Time of last liquid consumption: 0430                        Time of last solid consumption: 0430                        Date of last liquid consumption: 02/03/21                        Date of last solid food consumption: 02/03/21    BMI:   Wt Readings from Last 3 Encounters:   02/03/21 160 lb (72.6 kg)   05/26/19 138 lb (62.6 kg)   03/25/19 144 lb (65.3 kg)     Body mass index is 27.46 kg/m². CBC:   Lab Results   Component Value Date    WBC 11.9 02/03/2021    RBC 3.92 02/03/2021    HGB 12.6 02/03/2021    HCT 37.0 02/03/2021    MCV 94.4 02/03/2021     02/03/2021       CMP: No results found for: NA, K, CL, CO2, BUN, CREATININE, GFRAA, AGRATIO, LABGLOM, GLUCOSE, PROT, CALCIUM, BILITOT, ALKPHOS, AST, ALT    POC Tests: No results for input(s): POCGLU, POCNA, POCK, POCCL, POCBUN, POCHEMO, POCHCT in the last 72 hours. Coags: No results found for: PROTIME, INR, APTT    HCG (If Applicable): No results found for: PREGTESTUR, PREGSERUM, HCG, HCGQUANT     ABGs: No results found for: PHART, PO2ART, ZRO5LEI, DQB8ROD, BEART, [id number]     Type & Screen (If Applicable):  Lab Results   Component Value Date    LABRH POS 02/03/2021       Drug/Infectious Status (If Applicable):  No results found for: HIV, HEPCAB    COVID-19 Screening (If Applicable): No results found for: COVID19      Anesthesia Evaluation  Patient summary reviewed and Nursing notes reviewed no history of anesthetic complications:   Airway: Mallampati: II        Dental:          Pulmonary: breath sounds clear to auscultation                             Cardiovascular:            Rhythm: regular  Rate: normal                    Neuro/Psych:               GI/Hepatic/Renal:             Endo/Other:                     Abdominal:       Abdomen: soft.     Vascular:                                        Anesthesia Plan      epidural     ASA 2 MIPS: Postoperative opioids intended and Prophylactic antiemetics administered. Anesthetic plan and risks discussed with patient and spouse.                       [address] Neetu Merida, DO   2/3/2021

## 2021-02-03 NOTE — FLOWSHEET NOTE
External fetal monitor applied to soft nontender abdomen, plan of care discussed and pt verbalizes understanding

## 2021-02-03 NOTE — FLOWSHEET NOTE
Patient arrived to unit on foot for scheduled labor induction. No leakage of fluid, no bleeding, no c/o ctx, still feeling baby move. Patient oriented to the room, gown provided, call light within reach. Patient to bathroom to void, informed of maternal drug testing policy in place on all laboring patients. Verbal consent received, paper consent to be signed and urine to be sent. Explained patients right to have family, representative or physician notified of their admission. Patient has Declined for physician to be notified. Patient has Declined for family/representative to be notified.

## 2021-02-03 NOTE — FLOWSHEET NOTE
Dr. Blane Hewitt called and updated on FHT with FSE & interventions that have been done along with Brethine given. States to wait 30 minutes & if reassuring restart Pitocin.

## 2021-02-03 NOTE — PLAN OF CARE
Problem: Pain:  Goal: Pain level will decrease  Description: Pain level will decrease  2/3/2021 0744 by Cathy Iraheta RN  Outcome: Ongoing   Pain is a 1, planning labor epidural, pain goal 9  Problem: Anxiety:  Goal: Level of anxiety will decrease  Description: Level of anxiety will decrease  Outcome: Ongoing   calm  Problem: Breathing Pattern - Ineffective:  Goal: Able to breathe comfortably  Description: Able to breathe comfortably  Outcome: Ongoing   Respirations regular and easy  Problem: Fluid Volume - Imbalance:  Goal: Absence of imbalanced fluid volume signs and symptoms  Description: Absence of imbalanced fluid volume signs and symptoms  Outcome: Ongoing   stable  Problem: Infection - Intrapartum Infection:  Goal: Will show no infection signs and symptoms  Description: Will show no infection signs and symptoms  Outcome: Ongoing   afebrile  Problem: Labor Process - Prolonged:  Goal: Uterine contractions within specified parameters  Description: Uterine contractions within specified parameters  Outcome: Ongoing   Occasional contraction  Problem: Tissue Perfusion - Uteroplacental, Altered:  Goal: Absence of abnormal fetal heart rate pattern  Description: Absence of abnormal fetal heart rate pattern  Outcome: Ongoing   Reactive fht's  Problem: Urinary Retention:  Goal: Urinary elimination within specified parameters  Description: Urinary elimination within specified parameters  Outcome: Ongoing   Bathroom priviliges  Care plan reviewed with patient and Jae. Patient and Jae verbalize understanding of the plan of care and contribute to goal setting.

## 2021-02-03 NOTE — FLOWSHEET NOTE
Dr Gris Russo notified of pt arrival for love induction, would like pitocin started as well, may increase to 10 milliunits per minute until love falls out

## 2021-02-03 NOTE — PROGRESS NOTES
In to see pt. FHTs reactive. No ctx  Disc love ripening and pt agreeable  cvx 1/60/-2  Risks, benefits and alternatives of love induction of labor reviewed and patient agrees. Love placed without difficulty and inflated with 60 cc NS.  vs    Ewa Gan MD

## 2021-02-04 LAB — HEMOGLOBIN: 10.4 GM/DL (ref 12–16)

## 2021-02-04 PROCEDURE — 36415 COLL VENOUS BLD VENIPUNCTURE: CPT

## 2021-02-04 PROCEDURE — 1200000000 HC SEMI PRIVATE

## 2021-02-04 PROCEDURE — 85018 HEMOGLOBIN: CPT

## 2021-02-04 PROCEDURE — 6370000000 HC RX 637 (ALT 250 FOR IP): Performed by: OBSTETRICS & GYNECOLOGY

## 2021-02-04 RX ADMIN — DOCUSATE SODIUM 100 MG: 100 CAPSULE, LIQUID FILLED ORAL at 19:33

## 2021-02-04 RX ADMIN — ACETAMINOPHEN 650 MG: 325 TABLET ORAL at 15:26

## 2021-02-04 RX ADMIN — IBUPROFEN 800 MG: 800 TABLET, FILM COATED ORAL at 22:42

## 2021-02-04 RX ADMIN — ACETAMINOPHEN 650 MG: 325 TABLET ORAL at 10:24

## 2021-02-04 RX ADMIN — IBUPROFEN 800 MG: 800 TABLET, FILM COATED ORAL at 12:26

## 2021-02-04 RX ADMIN — IBUPROFEN 800 MG: 800 TABLET, FILM COATED ORAL at 04:20

## 2021-02-04 ASSESSMENT — PAIN SCALES - GENERAL
PAINLEVEL_OUTOF10: 4
PAINLEVEL_OUTOF10: 4
PAINLEVEL_OUTOF10: 5

## 2021-02-04 NOTE — FLOWSHEET NOTE
Pt ambulated to bathroom tolerated well, able to void small amount of urine, due to void x2, pericare pad and gown changed, pt states she is feeling light headed, pale color, pt able to communicate, states she is nauseous, smelly salt, able to transfer pt to wheelchair in bathroom, IV bolus given, bleeding, WNL, after smelly salt pt more alert with color returning to face, c/o nausea vomited, x2, zofran given, pt states she is feeling better, alert and oriented, IV fluids infusing, able to transfer to mom/baby rm 22 stable with  in crib significant other at side with belongings, Nicki Foss [facility] pushing baby in crib, Report given to [facility] RN/Celine RN.

## 2021-02-04 NOTE — PLAN OF CARE
Problem: Discharge Planning:  Goal: Discharged to appropriate level of care  Description: Discharged to appropriate level of care  Outcome: Ongoing  Note: Remains in hospital, discussed possible discharge needs. Problem: Fluid Volume - Imbalance:  Goal: Absence of imbalanced fluid volume signs and symptoms  Description: Absence of imbalanced fluid volume signs and symptoms  Outcome: Ongoing  Note: Encouraged PO fluids     Problem: Fluid Volume - Imbalance:  Goal: Absence of postpartum hemorrhage signs and symptoms  Description: Absence of postpartum hemorrhage signs and symptoms  Outcome: Ongoing  Note: Vaginal bleeding WNL, no clots or foul odors. Problem: Infection - Risk of, Puerperal Infection:  Goal: Will show no infection signs and symptoms  Description: Will show no infection signs and symptoms  Outcome: Ongoing  Note: Vital signs and assessments WNL. Problem: Mood - Altered:  Goal: Mood stable  Description: Mood stable  Outcome: Ongoing  Note: Bonding with baby, participating in infant care. Problem: Pain - Acute:  Goal: Pain level will decrease  Description: Pain level will decrease  Outcome: Ongoing  Note: Pain controlled with po meds. Discussed ice for perineal pain or the use of warm blanket/heating pad for uterine cramps. Pt states her pain goal 6/10 has been met. Care plan reviewed with patient and she contributes to goal setting and voices understanding of plan of care.

## 2021-02-04 NOTE — FLOWSHEET NOTE
Patient up to bathroom for second time. Ambulated to bathroom without difficulty. Voided large amount. Ambulated back to bed without difficulty. Fundal check post void at U/-2.

## 2021-02-04 NOTE — PLAN OF CARE
Problem: Discharge Planning:  Goal: Discharged to appropriate level of care  Description: Discharged to appropriate level of care  2/4/2021 0956 by Jossy Conroy RN  Outcome: Ongoing  Note: Plan is to be discharged home with . Problem: Fluid Volume - Imbalance:  Goal: Absence of imbalanced fluid volume signs and symptoms  Description: Absence of imbalanced fluid volume signs and symptoms  2/4/2021 0956 by Estela Fischer RN  Outcome: Ongoing  Note: Vaginal bleeding WNL, no clots or foul odors. Problem: Infection - Risk of, Puerperal Infection:  Goal: Will show no infection signs and symptoms  Description: Will show no infection signs and symptoms  2/4/2021 0956 by Estela Fischer RN  Outcome: Ongoing  Note: Afebrile this shift. Problem: Mood - Altered:  Goal: Mood stable  Description: Mood stable  2/4/2021 0956 by Estela Fischer RN  Outcome: Ongoing  Note: Emotional support provided. Problem: Pain - Acute:  Goal: Pain level will decrease  Description: Pain level will decrease  2/4/2021 0956 by Estela Fischer RN  Outcome: Ongoing  Note: Scheduled motrin given with relief. Pain goal of 6 met this shift. Tucks pads and dermaplast spray to perineum for pain. Care plan reviewed with patient and she contributes to goal setting and voices understanding of plan of care.

## 2021-02-04 NOTE — FLOWSHEET NOTE
Patient up to bathroom for first time. Ambulated to bathroom without difficulty. Voided large amount. Patient educated on james care, use of james bottle, extra pads, emergency call light, and to call with any increased bleeding or clots. Patient states understanding. Ambulated back to bed without difficulty. Fundal check post void at U/-2.

## 2021-02-04 NOTE — LACTATION NOTE
Provided and discussed nipple to nose latching. Discussed with pt. Keeping infant aligned with breast and body. Discussed nipple to nose latching and not nipple to lips. Encouraged pt. To burp infant before feeds. Will continue to follow up with pt. PRN.

## 2021-02-04 NOTE — PROGRESS NOTES
Department of Obstetrics and Gynecology  Labor and Delivery  Attending Post Partum Progress Note    PPD #1    SUBJECTIVE: Feeling well. No complaints. OBJECTIVE:     Vitals:  BP (!) 106/59   Pulse 89   Temp 98.1 °F (36.7 °C) (Oral)   Resp 16   Ht 5' 4\" (1.626 m)   Wt 160 lb (72.6 kg)   LMP  (LMP Unknown)   SpO2 97%   Breastfeeding Unknown   BMI 27.46 kg/m²     Uterus:  normal size, well involuted, firm, non-tender    DATA:    Hemoglobin:   Lab Results   Component Value Date    HGB 10.4 02/04/2021       ASSESSMENT & PLAN:  Doing well. Anticipate home on post partum day #2.     Marina Burrows 2/4/2021

## 2021-02-04 NOTE — FLOWSHEET NOTE
Infant has not roomed in with mother this shift. Infant has gone to nursery in between feedings due to maternal exhaustion. Benefits of rooming in discussed.

## 2021-02-05 VITALS
BODY MASS INDEX: 27.31 KG/M2 | DIASTOLIC BLOOD PRESSURE: 63 MMHG | HEART RATE: 77 BPM | HEIGHT: 64 IN | OXYGEN SATURATION: 97 % | RESPIRATION RATE: 16 BRPM | WEIGHT: 160 LBS | TEMPERATURE: 97.5 F | SYSTOLIC BLOOD PRESSURE: 103 MMHG

## 2021-02-05 PROCEDURE — 6370000000 HC RX 637 (ALT 250 FOR IP): Performed by: OBSTETRICS & GYNECOLOGY

## 2021-02-05 PROCEDURE — G0008 ADMIN INFLUENZA VIRUS VAC: HCPCS | Performed by: OBSTETRICS & GYNECOLOGY

## 2021-02-05 PROCEDURE — 90686 IIV4 VACC NO PRSV 0.5 ML IM: CPT | Performed by: OBSTETRICS & GYNECOLOGY

## 2021-02-05 PROCEDURE — 6360000002 HC RX W HCPCS: Performed by: OBSTETRICS & GYNECOLOGY

## 2021-02-05 RX ADMIN — IBUPROFEN 800 MG: 800 TABLET, FILM COATED ORAL at 08:38

## 2021-02-05 RX ADMIN — INFLUENZA A VIRUS A/VICTORIA/2454/2019 IVR-207 (H1N1) ANTIGEN (PROPIOLACTONE INACTIVATED), INFLUENZA A VIRUS A/HONG KONG/2671/2019 IVR-208 (H3N2) ANTIGEN (PROPIOLACTONE INACTIVATED), INFLUENZA B VIRUS B/VICTORIA/705/2018 BVR-11 ANTIGEN (PROPIOLACTONE INACTIVATED), INFLUENZA B VIRUS B/PHUKET/3073/2013 BVR-1B ANTIGEN (PROPIOLACTONE INACTIVATED) 0.5 ML: 15; 15; 15; 15 INJECTION, SUSPENSION INTRAMUSCULAR at 10:03

## 2021-02-05 RX ADMIN — DOCUSATE SODIUM 100 MG: 100 CAPSULE, LIQUID FILLED ORAL at 08:38

## 2021-02-05 ASSESSMENT — PAIN SCALES - GENERAL
PAINLEVEL_OUTOF10: 4
PAINLEVEL_OUTOF10: 4

## 2021-02-05 NOTE — LACTATION NOTE
Pt. Stated that nursing is improving. Pt. Denied any questions or concerns at this time. Discussed nipple shield use with pt. Provided pt. With hand outs. Will continue to follow up with pt. PRN.

## 2021-02-05 NOTE — PLAN OF CARE
Problem: Discharge Planning:  Goal: Discharged to appropriate level of care  Description: Discharged to appropriate level of care  2/5/2021 0924 by Donn Valenzuela RN  Outcome: Ongoing  Note: Home going instructions given to pt and voiced understanding     Problem: Fluid Volume - Imbalance:  Goal: Absence of imbalanced fluid volume signs and symptoms  Description: Absence of imbalanced fluid volume signs and symptoms  Outcome: Ongoing  Note: Tolerating fluids well, no untoward s/sr eported     Problem: Fluid Volume - Imbalance:  Goal: Absence of postpartum hemorrhage signs and symptoms  Description: Absence of postpartum hemorrhage signs and symptoms  2/5/2021 0924 by Donn Valenzuela RN  Outcome: Ongoing  Note: Small amount of red lochai fundus is firm and centered     Problem: Infection - Risk of, Puerperal Infection:  Goal: Will show no infection signs and symptoms  Description: Will show no infection signs and symptoms  2/5/2021 0924 by Donn Valenzuela RN  Outcome: Ongoing  Note: V/S NWL< no untoward s/s reported     Problem: Mood - Altered:  Goal: Mood stable  Description: Mood stable  2/5/2021 0924 by Donn Valenzuela RN  Outcome: Ongoing  Note: Pleasant     Problem: Pain - Acute:  Goal: Pain level will decrease  Description: Pain level will decrease  2/5/2021 0924 by Donn Valenzuela RN  Outcome: Ongoing  Note: Pain level is \"4\", pain goal is \"6\". Patient takes Tylenol and Motrin for pain, voiced with relief   Care plan reviewed with patient and verbalized understanding. Patient contributed to goal setting.

## 2021-02-05 NOTE — PROGRESS NOTES
Department of Obstetrics and Gynecology  Labor and Delivery  Attending Post Partum Progress Note    PPD #2    SUBJECTIVE: Feeling well. No complaints. OBJECTIVE:     Vitals:  /63   Pulse 77   Temp 97.5 °F (36.4 °C) (Oral)   Resp 16   Ht 5' 4\" (1.626 m)   Wt 160 lb (72.6 kg)   LMP  (LMP Unknown)   SpO2 97%   Breastfeeding Unknown   BMI 27.46 kg/m²     Uterus:  normal size, well involuted, firm, non-tender    DATA:    Hemoglobin:   Lab Results   Component Value Date    HGB 10.4 02/04/2021       ASSESSMENT & PLAN:  Doing well. Anticipate home on post partum day #2.     Safia Merchant 2/5/2021

## 2021-02-05 NOTE — PLAN OF CARE
Problem: Discharge Planning:  Goal: Discharged to appropriate level of care  Description: Discharged to appropriate level of care  Outcome: Ongoing  Note: Plans to be discharged home with family when appropriate       Problem: Fluid Volume - Imbalance:  Goal: Absence of postpartum hemorrhage signs and symptoms  Description: Absence of postpartum hemorrhage signs and symptoms  Outcome: Ongoing  Note: Vital signs and assessments WNL, small rubra lochia, no clots     Problem: Infection - Risk of, Puerperal Infection:  Goal: Will show no infection signs and symptoms  Description: Will show no infection signs and symptoms  Outcome: Ongoing  Note: Vital signs and assessments WNL. Problem: Mood - Altered:  Goal: Mood stable  Description: Mood stable  Outcome: Ongoing  Note: Bonding with baby, participating in infant care. Problem: Pain - Acute:  Goal: Pain level will decrease  Description: Pain level will decrease  Outcome: Ongoing  Note: Pain controlled with po meds. Discussed ice for perineal pain or the use of warm blanket/heating pad for uterine cramps. Pt states her pain goal 6/10 has been met. Care plan reviewed with patient and . Patient and  verbalize understanding of the plan of care and contribute to goal setting.

## 2021-02-05 NOTE — FLOWSHEET NOTE
Postpartum education brochure given, teaching complete. [facility] postpartum depression screening discussed with patient. [facility] and contact her healthcare provider if her score is > 10. Patient voiced understanding. Reviewed postpartum birth warning signs flyer with patient. Patient has voiced understanding of teaching. Discharge teaching and instructions for diagnosis/procedure of vaginal delivery completed with patient using teachback method. AVS reviewed. Printed prescriptions given to patient. Patient voiced understanding regarding prescriptions, follow up appointments, and care of self at home. Pt instructed on perineal care and self administration of perineal meds. Pt verbalized understanding and may self medicate. Mother's blood type is A+.  Pt refused offer of Tdap Vaccine

## 2024-03-04 ENCOUNTER — NURSE ONLY (OUTPATIENT)
Dept: LAB | Age: 29
End: 2024-03-04

## 2024-03-06 LAB
SOURCE: NORMAL
TRICHOMONAS VAGINALI, MOLECULAR: NEGATIVE

## 2024-03-07 LAB
C. TRACHOMATIS DNA,THIN PREP: NEGATIVE
N. GONORRHOEAE DNA, THIN PREP: NEGATIVE
SOURCE: NORMAL

## 2024-03-15 LAB — CYTOLOGY THIN PREP PAP: NORMAL

## 2024-10-09 ENCOUNTER — HOSPITAL ENCOUNTER (INPATIENT)
Age: 29
LOS: 1 days | Discharge: HOME | End: 2024-10-10
Attending: OBSTETRICS & GYNECOLOGY | Admitting: OBSTETRICS & GYNECOLOGY
Payer: COMMERCIAL

## 2024-10-09 ENCOUNTER — ANESTHESIA EVENT (OUTPATIENT)
Dept: LABOR AND DELIVERY | Age: 29
End: 2024-10-09
Payer: COMMERCIAL

## 2024-10-09 ENCOUNTER — ANESTHESIA (OUTPATIENT)
Dept: LABOR AND DELIVERY | Age: 29
End: 2024-10-09
Payer: COMMERCIAL

## 2024-10-09 ENCOUNTER — APPOINTMENT (OUTPATIENT)
Dept: LABOR AND DELIVERY | Age: 29
End: 2024-10-09
Payer: COMMERCIAL

## 2024-10-09 PROBLEM — Z34.90 ENCOUNTER FOR PLANNED INDUCTION OF LABOR: Status: ACTIVE | Noted: 2024-10-09

## 2024-10-09 LAB
ABO: NORMAL
AMPHETAMINES UR QL SCN: NEGATIVE
ANTIBODY SCREEN: NORMAL
BARBITURATES UR QL SCN: NEGATIVE
BASOPHILS ABSOLUTE: 0 THOU/MM3 (ref 0–0.1)
BASOPHILS NFR BLD AUTO: 0.5 %
BENZODIAZ UR QL SCN: NEGATIVE
BZE UR QL SCN: NEGATIVE
CANNABINOIDS UR QL SCN: NEGATIVE
DEPRECATED RDW RBC AUTO: 46.1 FL (ref 35–45)
EOSINOPHIL NFR BLD AUTO: 0.7 %
EOSINOPHILS ABSOLUTE: 0.1 THOU/MM3 (ref 0–0.4)
ERYTHROCYTE [DISTWIDTH] IN BLOOD BY AUTOMATED COUNT: 13.6 % (ref 11.5–14.5)
FENTANYL: NEGATIVE
HCT VFR BLD AUTO: 38.4 % (ref 37–47)
HGB BLD-MCNC: 13.2 GM/DL (ref 12–16)
IMM GRANULOCYTES # BLD AUTO: 0.04 THOU/MM3 (ref 0–0.07)
IMM GRANULOCYTES NFR BLD AUTO: 0.5 %
LYMPHOCYTES ABSOLUTE: 2.2 THOU/MM3 (ref 1–4.8)
LYMPHOCYTES NFR BLD AUTO: 25.7 %
MCH RBC QN AUTO: 31.7 PG (ref 26–33)
MCHC RBC AUTO-ENTMCNC: 34.4 GM/DL (ref 32.2–35.5)
MCV RBC AUTO: 92.3 FL (ref 81–99)
MONOCYTES ABSOLUTE: 0.4 THOU/MM3 (ref 0.4–1.3)
MONOCYTES NFR BLD AUTO: 4.3 %
NEUTROPHILS ABSOLUTE: 5.9 THOU/MM3 (ref 1.8–7.7)
NEUTROPHILS NFR BLD AUTO: 68.3 %
NRBC BLD AUTO-RTO: 0 /100 WBC
OPIATES UR QL SCN: NEGATIVE
OXYCODONE: NEGATIVE
PCP UR QL SCN: NEGATIVE
PLATELET # BLD AUTO: 178 THOU/MM3 (ref 130–400)
PMV BLD AUTO: 9.3 FL (ref 9.4–12.4)
RBC # BLD AUTO: 4.16 MILL/MM3 (ref 4.2–5.4)
RH FACTOR: NORMAL
RPR SER QL: NONREACTIVE
WBC # BLD AUTO: 8.6 THOU/MM3 (ref 4.8–10.8)

## 2024-10-09 PROCEDURE — 6360000002 HC RX W HCPCS: Performed by: OBSTETRICS & GYNECOLOGY

## 2024-10-09 PROCEDURE — 10907ZC DRAINAGE OF AMNIOTIC FLUID, THERAPEUTIC FROM PRODUCTS OF CONCEPTION, VIA NATURAL OR ARTIFICIAL OPENING: ICD-10-PCS | Performed by: OBSTETRICS & GYNECOLOGY

## 2024-10-09 PROCEDURE — 0HQ9XZZ REPAIR PERINEUM SKIN, EXTERNAL APPROACH: ICD-10-PCS | Performed by: OBSTETRICS & GYNECOLOGY

## 2024-10-09 PROCEDURE — 86592 SYPHILIS TEST NON-TREP QUAL: CPT

## 2024-10-09 PROCEDURE — 7200000001 HC VAGINAL DELIVERY

## 2024-10-09 PROCEDURE — 2500000003 HC RX 250 WO HCPCS: Performed by: ANESTHESIOLOGY

## 2024-10-09 PROCEDURE — 86850 RBC ANTIBODY SCREEN: CPT

## 2024-10-09 PROCEDURE — 1220000000 HC SEMI PRIVATE OB R&B

## 2024-10-09 PROCEDURE — 3E033VJ INTRODUCTION OF OTHER HORMONE INTO PERIPHERAL VEIN, PERCUTANEOUS APPROACH: ICD-10-PCS | Performed by: OBSTETRICS & GYNECOLOGY

## 2024-10-09 PROCEDURE — 6370000000 HC RX 637 (ALT 250 FOR IP): Performed by: OBSTETRICS & GYNECOLOGY

## 2024-10-09 PROCEDURE — 2580000003 HC RX 258: Performed by: OBSTETRICS & GYNECOLOGY

## 2024-10-09 PROCEDURE — 86901 BLOOD TYPING SEROLOGIC RH(D): CPT

## 2024-10-09 PROCEDURE — 80307 DRUG TEST PRSMV CHEM ANLYZR: CPT

## 2024-10-09 PROCEDURE — 2500000003 HC RX 250 WO HCPCS

## 2024-10-09 PROCEDURE — 85025 COMPLETE CBC W/AUTO DIFF WBC: CPT

## 2024-10-09 PROCEDURE — 86900 BLOOD TYPING SEROLOGIC ABO: CPT

## 2024-10-09 PROCEDURE — 3700000025 EPIDURAL BLOCK: Performed by: ANESTHESIOLOGY

## 2024-10-09 RX ORDER — EPHEDRINE SULFATE/0.9% NACL/PF 25 MG/5 ML
SYRINGE (ML) INTRAVENOUS
Status: COMPLETED
Start: 2024-10-09 | End: 2024-10-09

## 2024-10-09 RX ORDER — DOCUSATE SODIUM 100 MG/1
100 CAPSULE, LIQUID FILLED ORAL 2 TIMES DAILY PRN
Status: DISCONTINUED | OUTPATIENT
Start: 2024-10-09 | End: 2024-10-10 | Stop reason: HOSPADM

## 2024-10-09 RX ORDER — ONDANSETRON 4 MG/1
4 TABLET, ORALLY DISINTEGRATING ORAL EVERY 6 HOURS PRN
Status: DISCONTINUED | OUTPATIENT
Start: 2024-10-09 | End: 2024-10-09

## 2024-10-09 RX ORDER — BUTORPHANOL TARTRATE 1 MG/ML
1 INJECTION, SOLUTION INTRAMUSCULAR; INTRAVENOUS
Status: DISCONTINUED | OUTPATIENT
Start: 2024-10-09 | End: 2024-10-09 | Stop reason: HOSPADM

## 2024-10-09 RX ORDER — TERBUTALINE SULFATE 1 MG/ML
0.25 INJECTION, SOLUTION SUBCUTANEOUS
Status: DISCONTINUED | OUTPATIENT
Start: 2024-10-09 | End: 2024-10-09 | Stop reason: HOSPADM

## 2024-10-09 RX ORDER — BISACODYL 10 MG
10 SUPPOSITORY, RECTAL RECTAL DAILY PRN
Status: DISCONTINUED | OUTPATIENT
Start: 2024-10-09 | End: 2024-10-10 | Stop reason: HOSPADM

## 2024-10-09 RX ORDER — MISOPROSTOL 200 UG/1
800 TABLET ORAL PRN
Status: DISCONTINUED | OUTPATIENT
Start: 2024-10-09 | End: 2024-10-10 | Stop reason: HOSPADM

## 2024-10-09 RX ORDER — MISOPROSTOL 200 UG/1
TABLET ORAL
Status: DISCONTINUED
Start: 2024-10-09 | End: 2024-10-09 | Stop reason: WASHOUT

## 2024-10-09 RX ORDER — EPHEDRINE SULFATE/0.9% NACL/PF 25 MG/5 ML
5 SYRINGE (ML) INTRAVENOUS ONCE
Status: DISCONTINUED | OUTPATIENT
Start: 2024-10-09 | End: 2024-10-09

## 2024-10-09 RX ORDER — SODIUM CHLORIDE, SODIUM LACTATE, POTASSIUM CHLORIDE, AND CALCIUM CHLORIDE .6; .31; .03; .02 G/100ML; G/100ML; G/100ML; G/100ML
500 INJECTION, SOLUTION INTRAVENOUS PRN
Status: DISCONTINUED | OUTPATIENT
Start: 2024-10-09 | End: 2024-10-09 | Stop reason: HOSPADM

## 2024-10-09 RX ORDER — ACETAMINOPHEN 500 MG
1000 TABLET ORAL EVERY 8 HOURS SCHEDULED
Status: DISCONTINUED | OUTPATIENT
Start: 2024-10-09 | End: 2024-10-10 | Stop reason: HOSPADM

## 2024-10-09 RX ORDER — TRANEXAMIC ACID 10 MG/ML
1000 INJECTION, SOLUTION INTRAVENOUS
Status: DISCONTINUED | OUTPATIENT
Start: 2024-10-09 | End: 2024-10-10 | Stop reason: HOSPADM

## 2024-10-09 RX ORDER — CARBOPROST TROMETHAMINE 250 UG/ML
250 INJECTION, SOLUTION INTRAMUSCULAR PRN
Status: DISCONTINUED | OUTPATIENT
Start: 2024-10-09 | End: 2024-10-10 | Stop reason: HOSPADM

## 2024-10-09 RX ORDER — SODIUM CHLORIDE 0.9 % (FLUSH) 0.9 %
5-40 SYRINGE (ML) INJECTION EVERY 12 HOURS SCHEDULED
Status: DISCONTINUED | OUTPATIENT
Start: 2024-10-09 | End: 2024-10-10 | Stop reason: HOSPADM

## 2024-10-09 RX ORDER — DIPHENHYDRAMINE HCL 25 MG
25 TABLET ORAL EVERY 4 HOURS PRN
Status: DISCONTINUED | OUTPATIENT
Start: 2024-10-09 | End: 2024-10-09 | Stop reason: HOSPADM

## 2024-10-09 RX ORDER — ONDANSETRON 2 MG/ML
4 INJECTION INTRAMUSCULAR; INTRAVENOUS EVERY 6 HOURS PRN
Status: DISCONTINUED | OUTPATIENT
Start: 2024-10-09 | End: 2024-10-09 | Stop reason: HOSPADM

## 2024-10-09 RX ORDER — SODIUM CHLORIDE 0.9 % (FLUSH) 0.9 %
5-40 SYRINGE (ML) INJECTION PRN
Status: DISCONTINUED | OUTPATIENT
Start: 2024-10-09 | End: 2024-10-10 | Stop reason: HOSPADM

## 2024-10-09 RX ORDER — DOCUSATE SODIUM 100 MG/1
100 CAPSULE, LIQUID FILLED ORAL 2 TIMES DAILY PRN
Status: DISCONTINUED | OUTPATIENT
Start: 2024-10-09 | End: 2024-10-09 | Stop reason: HOSPADM

## 2024-10-09 RX ORDER — FERROUS SULFATE 325(65) MG
325 TABLET ORAL
Status: DISCONTINUED | OUTPATIENT
Start: 2024-10-10 | End: 2024-10-10 | Stop reason: HOSPADM

## 2024-10-09 RX ORDER — DIPHENHYDRAMINE HYDROCHLORIDE 50 MG/ML
25 INJECTION INTRAMUSCULAR; INTRAVENOUS EVERY 4 HOURS PRN
Status: DISCONTINUED | OUTPATIENT
Start: 2024-10-09 | End: 2024-10-09 | Stop reason: HOSPADM

## 2024-10-09 RX ORDER — SODIUM CHLORIDE 9 MG/ML
INJECTION, SOLUTION INTRAVENOUS PRN
Status: DISCONTINUED | OUTPATIENT
Start: 2024-10-09 | End: 2024-10-10 | Stop reason: HOSPADM

## 2024-10-09 RX ORDER — METHYLERGONOVINE MALEATE 0.2 MG/ML
200 INJECTION INTRAVENOUS PRN
Status: DISCONTINUED | OUTPATIENT
Start: 2024-10-09 | End: 2024-10-10 | Stop reason: HOSPADM

## 2024-10-09 RX ORDER — FENTANYL CITRATE 50 UG/ML
100 INJECTION, SOLUTION INTRAMUSCULAR; INTRAVENOUS
Status: DISCONTINUED | OUTPATIENT
Start: 2024-10-09 | End: 2024-10-09

## 2024-10-09 RX ORDER — OXYTOCIN/0.9 % SODIUM CHLORIDE 30/500 ML
87.3 PLASTIC BAG, INJECTION (ML) INTRAVENOUS PRN
Status: DISCONTINUED | OUTPATIENT
Start: 2024-10-09 | End: 2024-10-09

## 2024-10-09 RX ORDER — MODIFIED LANOLIN
OINTMENT (GRAM) TOPICAL PRN
Status: DISCONTINUED | OUTPATIENT
Start: 2024-10-09 | End: 2024-10-10 | Stop reason: HOSPADM

## 2024-10-09 RX ORDER — TRANEXAMIC ACID 10 MG/ML
1000 INJECTION, SOLUTION INTRAVENOUS
Status: DISCONTINUED | OUTPATIENT
Start: 2024-10-09 | End: 2024-10-09 | Stop reason: HOSPADM

## 2024-10-09 RX ORDER — SODIUM CHLORIDE, SODIUM LACTATE, POTASSIUM CHLORIDE, AND CALCIUM CHLORIDE .6; .31; .03; .02 G/100ML; G/100ML; G/100ML; G/100ML
1000 INJECTION, SOLUTION INTRAVENOUS PRN
Status: DISCONTINUED | OUTPATIENT
Start: 2024-10-09 | End: 2024-10-09 | Stop reason: HOSPADM

## 2024-10-09 RX ORDER — ONDANSETRON 2 MG/ML
4 INJECTION INTRAMUSCULAR; INTRAVENOUS EVERY 6 HOURS PRN
Status: DISCONTINUED | OUTPATIENT
Start: 2024-10-09 | End: 2024-10-10 | Stop reason: HOSPADM

## 2024-10-09 RX ORDER — ONDANSETRON 4 MG/1
4 TABLET, ORALLY DISINTEGRATING ORAL EVERY 6 HOURS PRN
Status: DISCONTINUED | OUTPATIENT
Start: 2024-10-09 | End: 2024-10-10 | Stop reason: HOSPADM

## 2024-10-09 RX ORDER — METHYLERGONOVINE MALEATE 0.2 MG/ML
200 INJECTION INTRAVENOUS PRN
Status: DISCONTINUED | OUTPATIENT
Start: 2024-10-09 | End: 2024-10-09 | Stop reason: HOSPADM

## 2024-10-09 RX ORDER — SODIUM CHLORIDE, SODIUM LACTATE, POTASSIUM CHLORIDE, CALCIUM CHLORIDE 600; 310; 30; 20 MG/100ML; MG/100ML; MG/100ML; MG/100ML
INJECTION, SOLUTION INTRAVENOUS CONTINUOUS
Status: DISCONTINUED | OUTPATIENT
Start: 2024-10-09 | End: 2024-10-10 | Stop reason: HOSPADM

## 2024-10-09 RX ORDER — OXYTOCIN/0.9 % SODIUM CHLORIDE 30/500 ML
1-20 PLASTIC BAG, INJECTION (ML) INTRAVENOUS CONTINUOUS
Status: DISCONTINUED | OUTPATIENT
Start: 2024-10-09 | End: 2024-10-09 | Stop reason: HOSPADM

## 2024-10-09 RX ORDER — CARBOPROST TROMETHAMINE 250 UG/ML
250 INJECTION, SOLUTION INTRAMUSCULAR PRN
Status: DISCONTINUED | OUTPATIENT
Start: 2024-10-09 | End: 2024-10-09 | Stop reason: HOSPADM

## 2024-10-09 RX ORDER — IBUPROFEN 800 MG/1
800 TABLET, FILM COATED ORAL EVERY 8 HOURS SCHEDULED
Status: DISCONTINUED | OUTPATIENT
Start: 2024-10-09 | End: 2024-10-10 | Stop reason: HOSPADM

## 2024-10-09 RX ORDER — SODIUM CHLORIDE, SODIUM LACTATE, POTASSIUM CHLORIDE, CALCIUM CHLORIDE 600; 310; 30; 20 MG/100ML; MG/100ML; MG/100ML; MG/100ML
INJECTION, SOLUTION INTRAVENOUS CONTINUOUS
Status: DISCONTINUED | OUTPATIENT
Start: 2024-10-09 | End: 2024-10-09 | Stop reason: HOSPADM

## 2024-10-09 RX ORDER — NALOXONE HYDROCHLORIDE 0.4 MG/ML
INJECTION, SOLUTION INTRAMUSCULAR; INTRAVENOUS; SUBCUTANEOUS PRN
Status: DISCONTINUED | OUTPATIENT
Start: 2024-10-09 | End: 2024-10-09 | Stop reason: HOSPADM

## 2024-10-09 RX ORDER — ONDANSETRON 2 MG/ML
4 INJECTION INTRAMUSCULAR; INTRAVENOUS EVERY 6 HOURS PRN
Status: DISCONTINUED | OUTPATIENT
Start: 2024-10-09 | End: 2024-10-09

## 2024-10-09 RX ORDER — MISOPROSTOL 200 UG/1
1000 TABLET ORAL PRN
Status: DISCONTINUED | OUTPATIENT
Start: 2024-10-09 | End: 2024-10-09

## 2024-10-09 RX ORDER — SEVOFLURANE 250 ML/250ML
1 LIQUID RESPIRATORY (INHALATION) CONTINUOUS PRN
Status: DISCONTINUED | OUTPATIENT
Start: 2024-10-09 | End: 2024-10-09 | Stop reason: HOSPADM

## 2024-10-09 RX ADMIN — IBUPROFEN 800 MG: 800 TABLET, FILM COATED ORAL at 15:13

## 2024-10-09 RX ADMIN — SODIUM CHLORIDE, POTASSIUM CHLORIDE, SODIUM LACTATE AND CALCIUM CHLORIDE: 600; 310; 30; 20 INJECTION, SOLUTION INTRAVENOUS at 09:49

## 2024-10-09 RX ADMIN — Medication 1 MILLI-UNITS/MIN: at 08:47

## 2024-10-09 RX ADMIN — EPHEDRINE SULFATE 5 MG: 5 INJECTION INTRAVENOUS at 13:01

## 2024-10-09 RX ADMIN — ONDANSETRON 4 MG: 2 INJECTION INTRAMUSCULAR; INTRAVENOUS at 13:47

## 2024-10-09 RX ADMIN — SODIUM CHLORIDE, POTASSIUM CHLORIDE, SODIUM LACTATE AND CALCIUM CHLORIDE: 600; 310; 30; 20 INJECTION, SOLUTION INTRAVENOUS at 08:23

## 2024-10-09 RX ADMIN — SODIUM CHLORIDE, POTASSIUM CHLORIDE, SODIUM LACTATE AND CALCIUM CHLORIDE: 600; 310; 30; 20 INJECTION, SOLUTION INTRAVENOUS at 13:04

## 2024-10-09 RX ADMIN — Medication 166.7 ML: at 14:33

## 2024-10-09 RX ADMIN — Medication 18 ML/HR: at 10:17

## 2024-10-09 RX ADMIN — DOCUSATE SODIUM 100 MG: 100 CAPSULE, LIQUID FILLED ORAL at 21:01

## 2024-10-09 ASSESSMENT — PAIN DESCRIPTION - DESCRIPTORS: DESCRIPTORS: CRAMPING

## 2024-10-09 ASSESSMENT — PAIN - FUNCTIONAL ASSESSMENT: PAIN_FUNCTIONAL_ASSESSMENT: ACTIVITIES ARE NOT PREVENTED

## 2024-10-09 ASSESSMENT — PAIN SCALES - GENERAL: PAINLEVEL_OUTOF10: 1

## 2024-10-09 ASSESSMENT — PAIN DESCRIPTION - ORIENTATION: ORIENTATION: MID;LOWER

## 2024-10-09 ASSESSMENT — PAIN DESCRIPTION - LOCATION: LOCATION: ABDOMEN

## 2024-10-09 NOTE — ANESTHESIA PROCEDURE NOTES
Epidural Block    Patient location during procedure: OB  Start time: 10/9/2024 10:02 AM  End time: 10/9/2024 10:16 AM  Reason for block: labor epidural  Staffing  Performed: resident/CRNA   Anesthesiologist: Bhupendra Harden DO  Resident/CRNA: Faraz Solis APRN - CRNA  Performed by: Faraz Solis APRN - CRNA  Authorized by: Bhupendra Harden DO    Epidural  Patient position: sitting  Prep: ChloraPrep  Patient monitoring: continuous pulse ox and frequent blood pressure checks  Approach: midline  Location: L1-2  Injection technique: MARIS saline  Provider prep: sterile gloves and mask  Needle  Needle type: Tuohy   Needle gauge: 18 G  Needle length: 3.5 in  Catheter type: side hole  Catheter size: 19 G  Catheter at skin depth: 11 cm  Test dose: negativeCatheter Secured: tape and tegaderm  Assessment  Hemodynamics: stable  Attempts: 2  Outcomes: uncomplicated  Preanesthetic Checklist  Completed: patient identified, IV checked, site marked, risks and benefits discussed, surgical/procedural consents, equipment checked, pre-op evaluation, timeout performed, anesthesia consent given, oxygen available, monitors applied/VS acknowledged, fire risk safety assessment completed and verbalized and blood product R/B/A discussed and consented

## 2024-10-09 NOTE — PLAN OF CARE
Problem: Vaginal Birth or  Section  Goal: Fetal and maternal status remain reassuring during the birth process  Description:  Birth OB-Pregnancy care plan goal which identifies if the fetal and maternal status remain reassuring during the birth process  Outcome: Progressing  Flowsheets (Taken 10/9/2024 1209)  Fetal and Maternal Status Remain Reassuring During the Birth Process:   Monitor vital signs   Monitor fetal heart rate   Monitor uterine activity   Monitor labor progression (Vaginal delivery)     Problem: Pain  Goal: Verbalizes/displays adequate comfort level or baseline comfort level  Outcome: Progressing  Flowsheets (Taken 10/9/2024 1209)  Verbalizes/displays adequate comfort level or baseline comfort level:   Encourage patient to monitor pain and request assistance   Assess pain using appropriate pain scale   Administer analgesics based on type and severity of pain and evaluate response   Implement non-pharmacological measures as appropriate and evaluate response     Problem: Infection - Adult  Goal: Absence of fever/infection during anticipated neutropenic period  Outcome: Progressing  Flowsheets (Taken 10/9/2024 1209)  Absence of fever/infection during anticipated neutropenic period: Monitor white blood cell count  Note: Afebrile     Problem: Safety - Adult  Goal: Free from fall injury  Outcome: Progressing  Flowsheets (Taken 10/9/2024 1209)  Free From Fall Injury: Instruct family/caregiver on patient safety  Note: Call light within reach     Problem: Discharge Planning  Goal: Discharge to home or other facility with appropriate resources  Outcome: Progressing  Flowsheets (Taken 10/9/2024 1209)  Discharge to home or other facility with appropriate resources:   Identify barriers to discharge with patient and caregiver   Arrange for needed discharge resources and transportation as appropriate   Identify discharge learning needs (meds, wound care, etc)     Problem: Chronic Conditions and

## 2024-10-09 NOTE — FLOWSHEET NOTE
Dr. Mcdaniel returns call, updated that pt is comfortable with an epidural. FHT baseline was 110-115. RN notes baseline now 105, mod variability and accels. Since RN needs to chart bradycardia, RN wanted to let the MD know. Contractions 2-3 with IUPC in place. Cervix 4-5/80/-2. Resume POC

## 2024-10-09 NOTE — FLOWSHEET NOTE
Anesthesia phone text messaged that pt is comfortable with epidural. Resting on side BP 91/52. Asymptomatic, 1116- Dr. Harden returns call, give pt 1 L bolus, give ephedrine 5mg to keep Systolic BP > 100

## 2024-10-09 NOTE — FLOWSHEET NOTE
Pt transferred to [facility] via wheelchair in satisfactory condition. Report to Mayte RAM

## 2024-10-09 NOTE — PROGRESS NOTES
Dr. Harden called and updated that pt would like a labor epidural. Platelets 179, states he will be up

## 2024-10-09 NOTE — FLOWSHEET NOTE
Pt assisted to BR, voiding QS. Lynsey care instructions given and then completed per pt. Pt returned to wheelchair for final assessment.

## 2024-10-09 NOTE — FLOWSHEET NOTE
Pt admitted to room 5a15 from L/D per wheelchair with infant in arms. Vital signs stable. Pt still due to void times 1. Instructed to call out for help up. Plan of care discussed.

## 2024-10-09 NOTE — L&D DELIVERY NOTE
Department of Obstetrics and Gynecology  Spontaneous Vaginal Delivery Note      Pt Name: July Camacho  MRN: [id number] Acct #: [id number]  YOB: 1995  Procedure Performed By: Bibi Mcdaniel MD, MD        Pre-operative Diagnosis:  Term pregnancy, Induced labor, Single fetus, and Uncomplicated pregnancy    Post-operative Diagnosis: Same, delivered.    Procedure: spontaneous vaginal delivery    Surgeon:  Bibi Mcdaniel    Information for the patient's :  Bhupinder Camacho [[id number]]        Anesthesia:  epidural anesthesia    Estimated blood loss:  200 ml    Complications:  none    Condition:  infant stable to general nursery and mother stable    Delivery Summary:  The patient is a 29 y.o.  at 39w3d who was admitted for induction. She received the following interventions: AROM and IV pitocin induction.  The patient progressed well,did receive an epidural, became complete and started to push. She was placed in the dorsal lithotomy position and prepped. She delivered the baby which was then placed on the mother's abdomen. Cord was clamped and cut and infant handed off to the waiting nurse for evaluation. The placenta delivered intact, whole and that the umbilical cord had three vessels noted. The perineum and vagina were explored and  First degree and left periclitoral  laceration was repaired with Vicryl 4.0.       Vaginal sweep was performed at the conclusion of delivery and all needles were taken off the field. Sponge counts correct      PMH:  Past Medical History:   Diagnosis Date    Asthma     exercised induced as a child. nothing since and no meds         Bibi Mcdaniel MD

## 2024-10-09 NOTE — FLOWSHEET NOTE
here for scheduled induction at 39 4/7wks. Pt denies any regular contractions, leaking of fluid or vaginal bleeding at this time. Pt states baby is active. [place] drug screen discussed, pt voiced understanding and plans to give a sample.  Jae at bedside.

## 2024-10-09 NOTE — ANESTHESIA PRE PROCEDURE
Department of Anesthesiology  Preprocedure Note       Name:  July Camacho   Age:  29 y.o.  :  1995                                          MRN:  [id number]         Date:  10/9/2024      Surgeon: * No surgeons listed *    Procedure: * No procedures listed *    Medications prior to admission:   Prior to Admission medications    Medication Sig Start Date End Date Taking? Authorizing Provider   Multiple Vitamins-Minerals (THERAPEUTIC MULTIVITAMIN-MINERALS) tablet Take 1 tablet by mouth daily   Yes Provider, MD Citlaly       Current medications:    Current Facility-Administered Medications   Medication Dose Route Frequency Provider Last Rate Last Admin    oxytocin (PITOCIN) 30 units in 500 mL infusion  1-20 david-units/min IntraVENous Continuous Bibi Mcdaniel MD 2 mL/hr at 10/09/24 1027 2 david-units/min at 10/09/24 1027    terbutaline (BRETHINE) injection 0.25 mg  0.25 mg SubCUTAneous Once PRN Bibi Mcdaniel MD        lactated ringers IV soln infusion   IntraVENous Continuous Bibi Mcdaniel  mL/hr at 10/09/24 1027 Rate Verify at 10/09/24 1027    lactated ringers bolus 500 mL  500 mL IntraVENous PRN Bibi Mcdaniel MD        Or    lactated ringers bolus 1,000 mL  1,000 mL IntraVENous PRN Bibi Mcdaniel MD        butorphanol (STADOL) injection 1 mg  1 mg IntraVENous Q2H PRN Bibi Mcdaniel MD        fentaNYL (SUBLIMAZE) injection 100 mcg  100 mcg IntraVENous Q1H PRN Bibi Mcdaniel MD        nitrous oxide 50% inhalation 1 each  1 each Inhalation Continuous PRN Bibi Mcdaniel MD        ondansetron (ZOFRAN) injection 4 mg  4 mg IntraVENous Q6H PRN Bibi Mcdaniel MD        Or    ondansetron (ZOFRAN-ODT) disintegrating tablet 4 mg  4 mg Oral Q6H PRBibi Mariscal MD        diphenhydrAMINE (BENADRYL) tablet 25 mg  25 mg Oral Q4H PRN Bibi Mcdaniel MD        Or    diphenhydrAMINE (BENADRYL) injection 25 mg  25 mg IntraVENous Q4H PRBibi Mariscal MD        oxytocin

## 2024-10-09 NOTE — H&P
[facility]  History and Physical Update    Pt Name: July Camacho  MRN: [id number]  YOB: 1995  Date of evaluation: 10/9/2024    [x] I have examined the patient and reviewed the H&P/Consult and there are no changes to the patient or plans.    28yo  at 39/3 for IOL due to term gestation. CE: 3/50/-2. AROM with clear fluild. Cat 1 tracing. IOL via pitocin. GBS neg.    [] I have examined the patient and reviewed the H&P/Consult and have noted the following changes:       Discussion with the patient and/ or family for proposed care, treatment, services; benefits, risks, side effects; likelihood of achieving goals and potential problems that may occur during recuperation was had and all questions were answered.  Discussion with the patient and/ or family of reasonable alternatives to the proposed care, treatment, services and the discussion of the risks, benefits, side effects related to the alternatives and the risk related to not receiving the proposed care treatment services was also had and all questions were answered.    If this is for an elective surgical procedure then The patient was counseled at length about the risks of sivakumar Covid-19 during their perioperative period and any recovery window from their procedure.  The patient was made aware that sivakumar Covid-19  may worsen their prognosis for recovering from their procedure  and lend to a higher morbidity and/or mortality risk.  All material risks, benefits, and reasonable alternatives including postponing the procedure were discussed. The patient  does wish to proceed with the procedure at this time.             Bibi Mcdaniel MD,MD  Electronically signed 10/9/2024 at 8:49 AM

## 2024-10-09 NOTE — PLAN OF CARE
Problem: Postpartum  Goal: Experiences normal postpartum course  Description:  Postpartum OB-Pregnancy care plan goal which identifies if the mother is experiencing a normal postpartum course  Outcome: Progressing  Flowsheets (Taken 10/9/2024 1717)  Experiences Normal Postpartum Course:   Monitor maternal vital signs   Assess uterine involution  Note: Vital signs stable. Fundus firm midline one below umbilicus     Problem: Postpartum  Goal: Appropriate maternal -  bonding  Description:  Postpartum OB-Pregnancy care plan goal which identifies if the mother and  are bonding appropriately  Outcome: Progressing  Note: Mother bonding well with infant     Problem: Postpartum  Goal: Establishment of infant feeding pattern  Description:  Postpartum OB-Pregnancy care plan goal which identifies if the mother is establishing a feeding pattern with their   Outcome: Progressing  Flowsheets (Taken 10/9/2024 1717)  Establishment of Infant Feeding Pattern: Assess breast/bottle feeding  Note: Mother breast feeding     Problem: Pain  Goal: Verbalizes/displays adequate comfort level or baseline comfort level  Outcome: Progressing  Flowsheets  Taken 10/9/2024 1645 by Mayte Schneider RN  Verbalizes/displays adequate comfort level or baseline comfort level: Encourage patient to monitor pain and request assistance  Taken 10/9/2024 1209 by Tamia Argueta RN  Verbalizes/displays adequate comfort level or baseline comfort level:   Encourage patient to monitor pain and request assistance   Assess pain using appropriate pain scale   Administer analgesics based on type and severity of pain and evaluate response   Implement non-pharmacological measures as appropriate and evaluate response  Note: Pt taking motrin and tylenol for pain. Also discussed using ice and heating pad. Stated pain goal 5/10. Pt states pain well controlled.     Problem: Infection - Adult  Goal: Absence of infection at discharge  Outcome:

## 2024-10-10 VITALS
SYSTOLIC BLOOD PRESSURE: 106 MMHG | HEIGHT: 64 IN | BODY MASS INDEX: 28.34 KG/M2 | HEART RATE: 78 BPM | WEIGHT: 166 LBS | RESPIRATION RATE: 16 BRPM | TEMPERATURE: 98.1 F | DIASTOLIC BLOOD PRESSURE: 65 MMHG | OXYGEN SATURATION: 100 %

## 2024-10-10 LAB — HGB BLD-MCNC: 10.3 GM/DL (ref 12–16)

## 2024-10-10 PROCEDURE — 85018 HEMOGLOBIN: CPT

## 2024-10-10 PROCEDURE — 36415 COLL VENOUS BLD VENIPUNCTURE: CPT

## 2024-10-10 PROCEDURE — 6370000000 HC RX 637 (ALT 250 FOR IP): Performed by: OBSTETRICS & GYNECOLOGY

## 2024-10-10 RX ADMIN — ACETAMINOPHEN 1000 MG: 500 TABLET ORAL at 07:21

## 2024-10-10 RX ADMIN — IBUPROFEN 800 MG: 800 TABLET, FILM COATED ORAL at 00:09

## 2024-10-10 RX ADMIN — DOCUSATE SODIUM 100 MG: 100 CAPSULE, LIQUID FILLED ORAL at 08:19

## 2024-10-10 ASSESSMENT — PAIN DESCRIPTION - DESCRIPTORS
DESCRIPTORS: ACHING;SORE;CRAMPING
DESCRIPTORS: ACHING;SORE

## 2024-10-10 ASSESSMENT — PAIN DESCRIPTION - LOCATION
LOCATION: BACK;PERINEUM
LOCATION: ABDOMEN;PERINEUM

## 2024-10-10 ASSESSMENT — PAIN SCALES - GENERAL
PAINLEVEL_OUTOF10: 4
PAINLEVEL_OUTOF10: 4

## 2024-10-10 ASSESSMENT — PAIN - FUNCTIONAL ASSESSMENT
PAIN_FUNCTIONAL_ASSESSMENT: ACTIVITIES ARE NOT PREVENTED
PAIN_FUNCTIONAL_ASSESSMENT: ACTIVITIES ARE NOT PREVENTED

## 2024-10-10 NOTE — LACTATION NOTE
Provided and discussed breastfeeding hand out with pt.  Pt. Stated she is going to pump and feed when she gets home.  Encouraged pt. To call out for assistance if needed.

## 2024-10-10 NOTE — ANESTHESIA POSTPROCEDURE EVALUATION
Department of Anesthesiology  Postprocedure Note    Patient: July Camacho  MRN: [id number]  YOB: 1995  Date of evaluation: 10/10/2024    Procedure Summary       Date: 10/09/24 Room / Location:     Anesthesia Start: 1002 Anesthesia Stop: 1427    Procedure: Labor Analgesia Diagnosis:     Scheduled Providers:  Responsible Provider: Bhupendra Harden DO    Anesthesia Type: epidural ASA Status: 2            Anesthesia Type: No value filed.    Chana Phase I: Chana Score: 9    Chana Phase II: Chana Score: 10    Anesthesia Post Evaluation    Patient location during evaluation: floor  Patient participation: complete - patient participated  Level of consciousness: awake  Airway patency: patent  Nausea & Vomiting: no vomiting and no nausea  Cardiovascular status: hemodynamically stable  Respiratory status: acceptable  Hydration status: stable  Pain management: adequate        No notable events documented.

## 2024-10-10 NOTE — PROGRESS NOTES
Department of Obstetrics and Gynecology  Labor and Delivery  Attending Post Partum Progress Note    PPD #1    SUBJECTIVE: Feeling well. No complaints.    OBJECTIVE:     Vitals:  /67   Pulse 83   Temp 98.1 °F (36.7 °C) (Oral)   Resp 16   Ht 1.626 m (5' 4\")   Wt 75.3 kg (166 lb)   SpO2 97%   Breastfeeding Unknown   BMI 28.49 kg/m²     Uterus:  normal size, well involuted, firm, non-tender    DATA:    Hemoglobin:   Lab Results   Component Value Date/Time    HGB 10.3 10/10/2024 06:28 AM       ASSESSMENT & PLAN:  Doing well. Pt requesting to go home today.    Bibi Mcdaniel MD 10/10/2024

## 2024-10-10 NOTE — PLAN OF CARE
Problem: Postpartum  Goal: Experiences normal postpartum course  Description:  Postpartum OB-Pregnancy care plan goal which identifies if the mother is experiencing a normal postpartum course  Outcome: Completed  Goal: Appropriate maternal -  bonding  Description:  Postpartum OB-Pregnancy care plan goal which identifies if the mother and  are bonding appropriately  Outcome: Completed  Goal: Establishment of infant feeding pattern  Description:  Postpartum OB-Pregnancy care plan goal which identifies if the mother is establishing a feeding pattern with their   Outcome: Completed     Problem: Pain  Goal: Verbalizes/displays adequate comfort level or baseline comfort level  Outcome: Completed     Problem: Infection - Adult  Goal: Absence of infection at discharge  Outcome: Completed     Problem: Safety - Adult  Goal: Free from fall injury  Outcome: Completed     Problem: Discharge Planning  Goal: Discharge to home or other facility with appropriate resources  Outcome: Completed   Plan of care discussed with mother and she contributes to goal setting and voices understanding of plan of care.

## 2024-10-10 NOTE — PLAN OF CARE
Problem: Safety - Adult  Goal: Free from fall injury  10/9/2024 2356 by Marlen Keen RN  Outcome: Progressing  Flowsheets  Taken 10/9/2024 2356  Free From Fall Injury: Instruct family/caregiver on patient safety  Taken 10/9/2024 2051  Free From Fall Injury: Instruct family/caregiver on patient safety  10/9/2024 1717 by Mayte Schneider RN  Outcome: Progressing  Flowsheets  Taken 10/9/2024 1645 by Mayte Schneider RN  Free From Fall Injury: Instruct family/caregiver on patient safety  Taken 10/9/2024 1209 by Tamia Argueta RN  Free From Fall Injury: Instruct family/caregiver on patient safety  Note: Safety and security reviewed with mother     Problem: Infection - Adult  Goal: Absence of infection at discharge  10/9/2024 2356 by Marlen Keen RN  Outcome: Progressing  Flowsheets  Taken 10/9/2024 2356  Absence of infection at discharge:   Monitor lab/diagnostic results   Assess and monitor for signs and symptoms of infection  Taken 10/9/2024 2051  Absence of infection at discharge:   Monitor lab/diagnostic results   Assess and monitor for signs and symptoms of infection  10/9/2024 1717 by Mayte Schneider RN  Outcome: Progressing  Flowsheets (Taken 10/9/2024 1717)  Absence of infection at discharge: Assess and monitor for signs and symptoms of infection  Note: Vital signs stable. No foul vaginal discharge     Problem: Pain  Goal: Verbalizes/displays adequate comfort level or baseline comfort level  10/9/2024 2356 by Marlen Keen RN  Outcome: Progressing  Flowsheets  Taken 10/9/2024 2356  Verbalizes/displays adequate comfort level or baseline comfort level:   Encourage patient to monitor pain and request assistance   Assess pain using appropriate pain scale  Taken 10/9/2024 2051  Verbalizes/displays adequate comfort level or baseline comfort level:   Encourage patient to monitor pain and request assistance   Assess pain using appropriate pain scale  10/9/2024 1717 by Mayte Schneider  pattern  Description:  Postpartum OB-Pregnancy care plan goal which identifies if the mother is establishing a feeding pattern with their   10/9/2024 2356 by Marlen Keen, RN  Outcome: Progressing  Flowsheets  Taken 10/9/2024 2356  Establishment of Infant Feeding Pattern:   Assess breast/bottle feeding   Refer to lactation as needed  Taken 10/9/2024 2051  Establishment of Infant Feeding Pattern:   Assess breast/bottle feeding   Refer to lactation as needed  10/9/2024 1717 by Mayte Schneider, RN  Outcome: Progressing  Flowsheets (Taken 10/9/2024 1717)  Establishment of Infant Feeding Pattern: Assess breast/bottle feeding  Note: Mother breast feeding     Problem: Discharge Planning  Goal: Discharge to home or other facility with appropriate resources  10/9/2024 2356 by Marlen Keen RN  Outcome: Progressing  Flowsheets  Taken 10/9/2024 2356  Discharge to home or other facility with appropriate resources: Identify barriers to discharge with patient and caregiver  Taken 10/9/2024 2051  Discharge to home or other facility with appropriate resources: Identify barriers to discharge with patient and caregiver  Note: Remains in hospital, discussed possible discharge needs.    10/9/2024 1717 by Mayte Schneider, RN  Outcome: Progressing  Flowsheets  Taken 10/9/2024 1645 by Mayte Schneider RN  Discharge to home or other facility with appropriate resources: Identify barriers to discharge with patient and caregiver  Taken 10/9/2024 1209 by Tamia Argueta RN  Discharge to home or other facility with appropriate resources:   Identify barriers to discharge with patient and caregiver   Arrange for needed discharge resources and transportation as appropriate   Identify discharge learning needs (meds, wound care, etc)  Note: Working toward discharge   Care plan reviewed with patient. Patient verbalized understanding of the plan of care and contribute to goal setting.        Negative

## 2024-10-10 NOTE — DISCHARGE INSTRUCTIONS
DISCHARGE INSTRUCTIONS FOR  PATIENTS AND FAMILY      Discharge Instructions for Labor and Delivery, Vaginal Birth     A vaginal birth refers to the baby being delivered through the vagina. The amount of time that labor can take varies greatly. Labor for the average first-born baby is about 16 hours.   Usually your hospital stay after a routine delivery is no more than two nights. Some new mothers go home the same day. Recovery from childbirth varies depending upon whether you had an episiotomy (an incision in the perineum, the area between your vaginal opening and your anus), the duration of labor and delivery, and the amount of rest you get.   In general, it takes about 6-8 weeks for a woman's body to recover from childbirth.   What You Will Need   Along with your medications, you will need the following:   Sanitary pads    Nursing pads    Witch hazel pads    Possibly a Sitz bath        Home Care    You will want to arrange for transportation home for you and your baby. The baby will need a car seat. You will receive instructions in the hospital for breastfeeding and taking care of the perineum area. You may use ointment for cracked nipples or warm water rinses to your perineum.   You will need to wear sanitary pads for about six weeks after delivery.    If you had an episiotomy or vaginal tear, you will be sent home with a plastic squirt bottle. Fill it with warm water and squirt over the vaginal and anal area every time you urinate and defecate.    Warm baths can be soothing to healing tissues.    Apply warm or cold cloths to sore breasts.    Apply ointment to cracked nipples.    Use nursing pads for leaky breast.    Apply witch hazel pads to sore perineum (area between vagina and anus).    Ask your doctor about when it is safe for you to shower or bathe.    Sit in a sitz bath to soothe sore perineum and/or hemorrhoids. A sitz bath is soaking the hip and buttocks area in warm water. You can buy a plastic sitz  bath at most drugstores. It will fit on your toilet. You can also use your bathtub.                  Diet    Eat a well balanced, healthy diet to help your recover from childbirth. If you are breastfeeding, you will need additional calories each day. You may also be advised to avoid certain foods.   Some women experience constipation after childbirth. To avoid this problem:   Drink plenty of fluids.   Eat foods high in fiber, such as:   Whole grain cereals and breads   Fruits and vegetables   Legumes (eg, beans, lentils)       Physical Activity    Labor and delivery is tiring. Rest when you can to regain your energy. Your doctor will encourage you to exercise by walking. Ask your doctor when you will be able to return to more strenuous exercise.   Your doctor may suggest you do Kegel exercises to strengthen your pelvic muscles. To do these tighten your muscles as if you were stopping your urine flow. Hold for a few seconds and then relax. Do these throughout the day.       Medications    Breastfeeding can cause your uterus to contract. If painful, your doctor may recommend a pain reliever. If you are breastfeeding, it's important to ask your doctor about taking medications. You may receive from the hospital a list of medications to avoid.   Once your doctor has approved your medications, it's important to:   Take your medication as directed. Do not change the amount or the schedule.   Do not stop taking them without talking to your doctor.   Do not share them.   Know what the results and side effects may be. Report bothersome side effects to your doctor.   Some drugs can be dangerous when mixed. Talk to a doctor or pharmacist if you are taking more than one drug. This includes over-the-counter medication and herb or dietary supplements.   Plan ahead for refills so you don't run out.   Ibuprofen over the counter works well for uterine contractions        Lifestyle Changes    Having a baby requires significant

## 2024-10-10 NOTE — PROGRESS NOTES
Pt up with assist to void. Voided moderate amount. Small amount of rubra lochia with no clots noted. New pad, medicated pads and dermaplast applied. Ambulated back to bed. Fundus firm midline and U/-1. Tolerated well.

## 2024-10-10 NOTE — DISCHARGE SUMMARY
Vaginal Delivery Discharge Summary    Gestational Age:39w3d    Antepartum complications: none    Admission date: 10/9/2024  8:09 AM      Type of Delivery:      [place] Data  Information for the patient's :  Bhupinder Camacho [[id number]]   male   Birth Weight: 3.38 kg (7 lb 7.2 oz)  Information for the patient's :  Bhupinder Camacho [[id number]]   APGAR One: 8   APGAR Five: 9    Labs: CBC   Lab Results   Component Value Date    HGB 10.3 (L) 10/10/2024    HCT 38.4 10/09/2024        Intrapartum complications: None    Postpartum complications: none    The patient is ambulating well. The patient is tolerating a normal diet.        Patient Instructions:   Activity: activity as tolerated and no sex for 6 weeks  Diet: regular  Wound Care: as directed    Discharge Information  Current Discharge Medication List        CONTINUE these medications which have NOT CHANGED    Details   Multiple Vitamins-Minerals (THERAPEUTIC MULTIVITAMIN-MINERALS) tablet Take 1 tablet by mouth daily           STOP taking these medications       aspirin 81 MG chewable tablet Comments:   Reason for Stopping:               No discharge procedures on file.    Condition: Good    Plan:   Follow up in 5 week(s)    Electronically signed by Bibi Mcdaniel MD on 10/10/2024 at 7:27 AM

## 2024-10-10 NOTE — PROGRESS NOTES
Post birth warning signs education paper given and reviewed, teaching complete. [place] postpartum depression screening discussed with patient, instructed to contact her healthcare provider if her score is > 10. Patient voiced understanding.  Mother's blood type is A+

## 2024-10-10 NOTE — L&D DELIVERY NOTE
Bhupinder Camacho [[id number]]      Labor Events     Labor: No  Cervical Ripening Date/Time:      Antibiotics Received during Labor: No  Rupture Date/Time:  10/9/24 08:45:00   Rupture Type: AROM  Fluid Color: Clear  Fluid Odor: None  Induction: AROM, Oxytocin  Augmentation: None  Labor Complications: None       Anesthesia    Method: Epidural       Labor Event Times      Labor onset date/time:  10/9/24 09:00:00     Dilation complete date/time:  10/9/24 14:16:00     Start pushing date/time:  10/9/2024 14:22:00   Decision date/time (emergent ):            Labor Length    1st stage: 5h 16m  2nd stage: 0h 11m  3rd stage: 0h 05m       Delivery Details      Delivery Date: 10/9/24 Delivery Time: 14:27:00   Delivery Type: Vaginal, Spontaneous               Presentation    Presentation: Vertex  _: Occiput  _: Anterior       Shoulder Dystocia    Shoulder Dystocia Present?: No       Assisted Delivery Details    Forceps Attempted?: No  Vacuum Extractor Attempted?: No                           Cord    Vessels: 3 Vessels  Complications: None  Delayed Cord Clamping?: Yes  Cord Blood Disposition: Lab  Gases Sent?: No              Placenta    Date/Time: 10/9/2024 14:32:00  Removal: Spontaneous  Appearance: Intact  Disposition: Placenta Refrigerator       Lacerations    Episiotomy: None  Perineal Lacerations: None  Number of Repair Packets: 1       Vaginal Counts    Initial Count Personnel: INSTRUMENTS-16,LAPS-5,NEEDLE-1  Initial Count Verified By: GIOVANI WHEELER RN  Intial Sponge Count: Correct Intial Needles Count: Correct Intial Instruments Count: Correct   Final Sponges Count: Correct Final Needles  Count: Correct Final Instruments Count: Correct   Final Count Personnel: INSTRUMENTS-16,LAPS-5,NEEDLE-1+1=2  Final Count Verified By: GIOVANI WHEELER RN/DR. SILVEIRA  Accurate Final Count?: Yes       Blood Loss  Mother: July Camacho C #[id number]     Start of Mother's Information      Delivery Blood Loss   Intrapartum &

## 2024-10-14 ENCOUNTER — HOSPITAL ENCOUNTER (OUTPATIENT)
Dept: INTERVENTIONAL RADIOLOGY/VASCULAR | Age: 29
Discharge: HOME | End: 2024-10-16
Attending: OBSTETRICS & GYNECOLOGY
Payer: COMMERCIAL

## 2024-10-14 DIAGNOSIS — M79.604 PAIN OF RIGHT LOWER EXTREMITY: ICD-10-CM

## 2024-10-14 PROCEDURE — 93971 EXTREMITY STUDY: CPT
